# Patient Record
Sex: FEMALE | Race: WHITE | Employment: UNEMPLOYED | URBAN - METROPOLITAN AREA
[De-identification: names, ages, dates, MRNs, and addresses within clinical notes are randomized per-mention and may not be internally consistent; named-entity substitution may affect disease eponyms.]

---

## 2020-05-06 ENCOUNTER — OFFICE VISIT (OUTPATIENT)
Dept: OBGYN CLINIC | Age: 40
End: 2020-05-06
Payer: COMMERCIAL

## 2020-05-06 VITALS
HEART RATE: 87 BPM | SYSTOLIC BLOOD PRESSURE: 124 MMHG | BODY MASS INDEX: 33.44 KG/M2 | DIASTOLIC BLOOD PRESSURE: 81 MMHG | WEIGHT: 181.7 LBS | HEIGHT: 62 IN

## 2020-05-06 PROBLEM — Z64.1 GRAND MULTIPARITY: Status: ACTIVE | Noted: 2020-05-06

## 2020-05-06 PROBLEM — O09.90 HIGH-RISK PREGNANCY: Status: ACTIVE | Noted: 2020-05-06

## 2020-05-06 PROBLEM — J02.0 STREP THROAT: Status: ACTIVE | Noted: 2019-12-20

## 2020-05-06 PROBLEM — O09.529 ADVANCED MATERNAL AGE IN MULTIGRAVIDA: Status: ACTIVE | Noted: 2020-05-06

## 2020-05-06 PROBLEM — O42.90 AMNIOTIC FLUID LEAKING: Status: ACTIVE | Noted: 2019-07-20

## 2020-05-06 PROBLEM — J02.9 SORE THROAT: Status: RESOLVED | Noted: 2019-12-20 | Resolved: 2020-05-06

## 2020-05-06 PROBLEM — Z98.891 HISTORY OF CESAREAN SECTION: Status: ACTIVE | Noted: 2017-11-08

## 2020-05-06 PROBLEM — J02.0 STREP THROAT: Status: RESOLVED | Noted: 2019-12-20 | Resolved: 2020-05-06

## 2020-05-06 PROBLEM — Z86.718 HISTORY OF DVT (DEEP VEIN THROMBOSIS): Status: ACTIVE | Noted: 2020-05-06

## 2020-05-06 PROBLEM — I82.90 VTE (VENOUS THROMBOEMBOLISM): Status: ACTIVE | Noted: 2017-07-14

## 2020-05-06 PROBLEM — J02.9 SORE THROAT: Status: ACTIVE | Noted: 2019-12-20

## 2020-05-06 PROBLEM — O42.90 AMNIOTIC FLUID LEAKING: Status: RESOLVED | Noted: 2019-07-20 | Resolved: 2020-05-06

## 2020-05-06 LAB
CONTROL: PRESENT
PREGNANCY TEST URINE, POC: POSITIVE

## 2020-05-06 PROCEDURE — 81025 URINE PREGNANCY TEST: CPT | Performed by: ADVANCED PRACTICE MIDWIFE

## 2020-05-06 PROCEDURE — 99202 OFFICE O/P NEW SF 15 MIN: CPT | Performed by: ADVANCED PRACTICE MIDWIFE

## 2020-05-06 RX ORDER — CEPHALEXIN 500 MG/1
CAPSULE ORAL
COMMUNITY
Start: 2020-02-02 | End: 2020-05-19 | Stop reason: ALTCHOICE

## 2020-05-06 NOTE — PROGRESS NOTES
[facility]   [place] [address] DR. Leann White [address] [place] [id number]  Dept: [phone]    Patient Name: Karan Ramos  Patient Age: 44 y.o. Date of Visit: 2020    SUBJECTIVE:    Chief Complaint:  Chief Complaint   Patient presents with    Amenorrhea     LMP 20       HPI:    Lori Kang  is being seen today for missed menses. She reports a  positive pregnancy test on end of march  This  is a planned pregnancy. She is  accepting at this time. LMP: Patient's last menstrual period was 2020. Sure and definite: Yes    28 day cycle: Yes    She was not on a contraceptive at the time of conception. Estimated weeks gestation today : 10w0d  Tentative TRINITY: 20    Relationship with FOB:  Iron Barton)    Patient reports concerns: no    Previous deliveries at [facility], reports has had one midwife birth and desires that experience again. Reports G3 birth was a primary c/s due to placenta abruption and has had 4 vaginal births since then. Reports had a blood clot in  when on birth control and goes on lovenox during her pregnancies. Reports no history of hypertension or gestational diabetes. OB History        7    Para   7    Term   7            AB        Living   7       SAB        TAB        Ectopic        Molar        Multiple        Live Births   7              No past medical history on file. Current Outpatient Medications   Medication Sig Dispense Refill    cephALEXin (KEFLEX) 500 MG capsule        No current facility-administered medications for this visit.           OBJECTIVE:    /81 (Site: Left Upper Arm, Position: Sitting, Cuff Size: Medium Adult)   Pulse 87   Ht 5' 2.4\" (1.585 m)   Wt 181 lb 11.2 oz (82.4 kg)   LMP 2020   BMI 32.81 kg/m²     FHT: 159    Mother's ethnicity:     Father's ethnicity:       She is complaining today of:   Pain: No  Cramping: No  Bleeding or spotting:

## 2020-05-19 ENCOUNTER — ROUTINE PRENATAL (OUTPATIENT)
Dept: PERINATAL CARE | Age: 40
End: 2020-05-19
Payer: COMMERCIAL

## 2020-05-19 ENCOUNTER — TELEPHONE (OUTPATIENT)
Dept: PERINATAL CARE | Age: 40
End: 2020-05-19

## 2020-05-19 VITALS
SYSTOLIC BLOOD PRESSURE: 116 MMHG | HEART RATE: 80 BPM | RESPIRATION RATE: 16 BRPM | DIASTOLIC BLOOD PRESSURE: 64 MMHG | TEMPERATURE: 98.6 F | WEIGHT: 182 LBS | HEIGHT: 62 IN | BODY MASS INDEX: 33.49 KG/M2

## 2020-05-19 PROBLEM — Z87.59 HISTORY OF PLACENTAL ABRUPTION: Status: ACTIVE | Noted: 2020-05-19

## 2020-05-19 PROCEDURE — 99242 OFF/OP CONSLTJ NEW/EST SF 20: CPT | Performed by: OBSTETRICS & GYNECOLOGY

## 2020-05-19 PROCEDURE — 76813 OB US NUCHAL MEAS 1 GEST: CPT | Performed by: OBSTETRICS & GYNECOLOGY

## 2020-05-19 PROCEDURE — 76801 OB US < 14 WKS SINGLE FETUS: CPT | Performed by: OBSTETRICS & GYNECOLOGY

## 2020-05-27 ENCOUNTER — HOSPITAL ENCOUNTER (OUTPATIENT)
Age: 40
Setting detail: SPECIMEN
Discharge: HOME | End: 2020-05-27
Payer: COMMERCIAL

## 2020-05-27 ENCOUNTER — NURSE ONLY (OUTPATIENT)
Dept: OBGYN CLINIC | Age: 40
End: 2020-05-27
Payer: COMMERCIAL

## 2020-05-27 LAB
HCT VFR BLD CALC: 37.9 % (ref 36.3–47.1)
HEMOGLOBIN: 12.5 G/DL (ref 11.9–15.1)
MCH RBC QN AUTO: 28.2 PG (ref 25.2–33.5)
MCHC RBC AUTO-ENTMCNC: 33 G/DL (ref 28.4–34.8)
MCV RBC AUTO: 85.4 FL (ref 82.6–102.9)
NRBC AUTOMATED: 0 PER 100 WBC
PDW BLD-RTO: 13.8 % (ref 11.8–14.4)
PLATELET # BLD: 262 K/UL (ref 138–453)
PMV BLD AUTO: 10.3 FL (ref 8.1–13.5)
RBC # BLD: 4.44 M/UL (ref 3.95–5.11)
WBC # BLD: 7.3 K/UL (ref 3.5–11.3)

## 2020-05-27 PROCEDURE — 36415 COLL VENOUS BLD VENIPUNCTURE: CPT | Performed by: ADVANCED PRACTICE MIDWIFE

## 2020-05-27 NOTE — PROGRESS NOTES
Performed venipuncture of the RT hand. Pt tolerated well. Blood Flow obtained.   Collected _ -SST  x -Lav    Done in office

## 2020-06-03 ENCOUNTER — HOSPITAL ENCOUNTER (OUTPATIENT)
Age: 40
Setting detail: SPECIMEN
Discharge: HOME | End: 2020-06-03
Payer: COMMERCIAL

## 2020-06-03 ENCOUNTER — NURSE ONLY (OUTPATIENT)
Dept: OBGYN CLINIC | Age: 40
End: 2020-06-03
Payer: COMMERCIAL

## 2020-06-03 LAB
ABSOLUTE EOS #: 0.13 K/UL (ref 0–0.44)
ABSOLUTE IMMATURE GRANULOCYTE: <0.03 K/UL (ref 0–0.3)
ABSOLUTE LYMPH #: 1.3 K/UL (ref 1.1–3.7)
ABSOLUTE MONO #: 0.35 K/UL (ref 0.1–1.2)
BASOPHILS # BLD: 0 % (ref 0–2)
BASOPHILS ABSOLUTE: <0.03 K/UL (ref 0–0.2)
DIFFERENTIAL TYPE: ABNORMAL
EOSINOPHILS RELATIVE PERCENT: 2 % (ref 1–4)
HCT VFR BLD CALC: 38.8 % (ref 36.3–47.1)
HEMOGLOBIN: 12.2 G/DL (ref 11.9–15.1)
IMMATURE GRANULOCYTES: 0 %
LYMPHOCYTES # BLD: 18 % (ref 24–43)
MCH RBC QN AUTO: 27.6 PG (ref 25.2–33.5)
MCHC RBC AUTO-ENTMCNC: 31.4 G/DL (ref 28.4–34.8)
MCV RBC AUTO: 87.8 FL (ref 82.6–102.9)
MONOCYTES # BLD: 5 % (ref 3–12)
NRBC AUTOMATED: 0 PER 100 WBC
PDW BLD-RTO: 14.1 % (ref 11.8–14.4)
PLATELET # BLD: 268 K/UL (ref 138–453)
PLATELET ESTIMATE: ABNORMAL
PMV BLD AUTO: 10.9 FL (ref 8.1–13.5)
RBC # BLD: 4.42 M/UL (ref 3.95–5.11)
RBC # BLD: ABNORMAL 10*6/UL
SEG NEUTROPHILS: 75 % (ref 36–65)
SEGMENTED NEUTROPHILS ABSOLUTE COUNT: 5.41 K/UL (ref 1.5–8.1)
WBC # BLD: 7.2 K/UL (ref 3.5–11.3)
WBC # BLD: ABNORMAL 10*3/UL

## 2020-06-03 PROCEDURE — 36415 COLL VENOUS BLD VENIPUNCTURE: CPT | Performed by: ADVANCED PRACTICE MIDWIFE

## 2020-06-17 ENCOUNTER — INITIAL PRENATAL (OUTPATIENT)
Dept: OBGYN CLINIC | Age: 40
End: 2020-06-17

## 2020-06-17 ENCOUNTER — HOSPITAL ENCOUNTER (OUTPATIENT)
Age: 40
Setting detail: SPECIMEN
Discharge: HOME | End: 2020-06-17
Payer: COMMERCIAL

## 2020-06-17 VITALS
BODY MASS INDEX: 32.99 KG/M2 | WEIGHT: 182.7 LBS | HEART RATE: 83 BPM | SYSTOLIC BLOOD PRESSURE: 116 MMHG | DIASTOLIC BLOOD PRESSURE: 68 MMHG

## 2020-06-17 PROCEDURE — 0500F INITIAL PRENATAL CARE VISIT: CPT | Performed by: ADVANCED PRACTICE MIDWIFE

## 2020-06-17 RX ORDER — ASPIRIN 81 MG/1
81 TABLET ORAL DAILY
Status: ON HOLD | COMMUNITY
End: 2020-11-30

## 2020-06-17 RX ORDER — CHOLECALCIFEROL (VITAMIN D3) 125 MCG
500 CAPSULE ORAL DAILY
COMMUNITY
End: 2021-04-06 | Stop reason: ALTCHOICE

## 2020-06-17 NOTE — PROGRESS NOTES
o She declined CF/SMA/FX testing.  She was counseled on Toxoplasmosis/Listeriosis (cats/raw meat).  She denies tobacco use. The patient was counseled on the risks of tobacco abuse, both maternal and fetal. She was instructed to stop smoking if currently using tobacco. Morbidity, mortality, and cessation programs were reviewed. The risks include but are not limited to increased risks of  labor,  delivery, premature rupture of membranes, intrauterine growth restriction, intrauterine fetal demise and abruptio placenta. Secondary smoke risks were also reviewed. Increases in cancer, respiratory problems, and sudden infant death syndrome were reviewed as well.  The patient was informed of a 2-4% risk of congenital anomalies in the general population. She was questioned in detail regarding any genetic misnomer history, chromosomal abnormalities, or learning disabilities in herself, the father of the baby or their families. She denies any history as stated above.  She declined further genetic testing. Nuchal Translucency/First Trimester Screen and/or Single Marker MSAFP testing was not ordered with attention to timing. She was informed that karyotyping is the only way to evaluate the fetus for genetic problems and genetic lethal anomalies. Amniocentesis was discussed: Not indicated.  Route of delivery and counseling on vaginal, operative vaginal, and  sections were discussed. Further counseling will be handled by the provider at subsequent visits.  Encouraged well-balanced diet, plenty of rest when needed, prenatal vitamins daily and walking for exercise. Discussed self-help for nausea, avoiding OTC medications until consulting provider or pharmacist, other than Tylenol PRN, minimal caffeine (1-2 cups daily) and avoiding alcohol. Discussed exercise safety in pregnancy.    The patient was informed that [facility] only delivers at  [address] and is agreeable to delivery at [facility].  She was made aware of the Midwife Philosophy against Elective Induction.  M referral placed. Javier Bertrand was seen today for initial prenatal visit. Diagnoses and all orders for this visit:    High-risk pregnancy in second trimester  -     HIV Screen; Future  -     PRENATAL PROFILE I; Future  -     Culture, Urine; Future  -     Urine Drug Screen; Future  -     C.trachomatis N.gonorrhoeae DNA, Urine; Future  -     Glucose Tolerance, 1 Hr; Future        Upon completion of the visit all questions were answered. ROS was done and is negative except as documented in HPI. History was reviewed as documented on Epic Navigator. Give bag  exercise sheet  pelvimetry  [employer]? Patient was seen with total face to face time of 25 minutes. More than 50% of this visit was on counseling and education regarding her diagnoses and her options. She was also counseled on her preventative health maintenance recommendations and follow-up. No follow-ups on file.     Electronically signed by: MAIRA Keith CNM

## 2020-06-17 NOTE — PROGRESS NOTES
New Obstetric Intake     Patient Name:Arlette Rose  Patient Age: 36 y.o. Date of Visit: 2020  Patient's estimated gestational age at visit: 16w0d    Any Concerns Today: no  Patient reports no complaints. She denies spotting, cramping or pain. OB History        8    Para   7    Term   7            AB        Living   7       SAB        TAB        Ectopic        Molar        Multiple        Live Births   7                Information about this pregnancy:    Planned Pregnancy: Yes, Accepting: Yes   Father of Baby: Janine Montez and is involved with the pregnancy   Patient's last menstrual period was 2020., Regular menses:  Yes   Date of Last Pap Smear: 20 normal   On Birth Control at Time of Conception: no   Early Dating Ultrasound: completed   Desires first trimester screening: Yes - already completed   Desires CF/SMA/FragileX screening: No    Risk Screening   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, Environmental/Work Hazards: No   Smoker: No   History of Substance Abuse: no   History of Sexual Abuse: no   Current of past history of intimate partner violence: no   Teratogen Exposure since finding out pregnant: no   Pets at home: yes - dog    Infection History:   Personal History of Chicken Pox or varicella vaccination: Yes   Agreeable to flu shot: possible   Agreeable to tdap: please discuss   Exposed to TB or live with someone with TB: no   Patient or partner history of genital herpes: no   Rash or viral illness since last menstrual period: no   Prior GBS-infected child: no   History of sexually transmitted infection(s) (STIs): no    Any recent travel within the last 3 months out of the country: no, Partner: no    Previous Birth History:    Vaginal Birth: yes    Birth: yes   Any previous birth complications: yes - placental abruption   History of hemorrhage during/after birth: no    High Risk Factor Screening for this Pregnancy:   Age greater than 35

## 2020-06-18 LAB
AMPHETAMINE SCREEN URINE: NEGATIVE
BARBITURATE SCREEN URINE: NEGATIVE
BENZODIAZEPINE SCREEN, URINE: NEGATIVE
BUPRENORPHINE URINE: NORMAL
C. TRACHOMATIS DNA ,URINE: NEGATIVE
CANNABINOID SCREEN URINE: NEGATIVE
COCAINE METABOLITE, URINE: NEGATIVE
CULTURE: ABNORMAL
CULTURE: ABNORMAL
Lab: ABNORMAL
MDMA URINE: NORMAL
METHADONE SCREEN, URINE: NEGATIVE
METHAMPHETAMINE, URINE: NORMAL
N. GONORRHOEAE DNA, URINE: NEGATIVE
OPIATES, URINE: NEGATIVE
OXYCODONE SCREEN URINE: NEGATIVE
PHENCYCLIDINE, URINE: NEGATIVE
PROPOXYPHENE, URINE: NORMAL
SPECIMEN DESCRIPTION: ABNORMAL
SPECIMEN DESCRIPTION: NORMAL
TEST INFORMATION: NORMAL
TRICYCLIC ANTIDEPRESSANTS, UR: NORMAL

## 2020-06-22 PROBLEM — R82.71 GBS BACTERIURIA: Status: ACTIVE | Noted: 2020-06-22

## 2020-06-22 RX ORDER — CEPHALEXIN 500 MG/1
500 CAPSULE ORAL 2 TIMES DAILY
Qty: 20 CAPSULE | Refills: 0 | Status: SHIPPED | OUTPATIENT
Start: 2020-06-22 | End: 2020-07-02

## 2020-07-01 ENCOUNTER — HOSPITAL ENCOUNTER (OUTPATIENT)
Age: 40
Setting detail: SPECIMEN
Discharge: HOME | End: 2020-07-01
Payer: COMMERCIAL

## 2020-07-01 LAB — HOMOCYSTEINE: 3.7 UMOL/L

## 2020-07-03 LAB
AT-III ACTIVITY: 94 % (ref 83–122)
PROTEIN C ACTIVITY: 119 %
PROTEIN S ACTIVITY: 66 % (ref 59–130)

## 2020-07-06 LAB
FACTOR V LEIDEN MUTATION: NORMAL
MTHFR MUTATION 677T/A1298C: NORMAL
PROTHROMBIN G20210A MUTATION: NORMAL

## 2020-07-07 LAB
ANTICARDIOLIPIN IGA ANTIBODY: 1.3 APU
ANTICARDIOLIPIN IGG ANTIBODY: 2.3 GPU
CARDIOLIPIN AB IGM: 18.6 MPU
DILUTE RUSSELL VIPER VENOM TIME: NORMAL
INR BLD: 0.9
LUPUS ANTICOAG: NORMAL
PARTIAL THROMBOPLASTIN TIME: 23.2 SEC (ref 20.5–30.5)
PROTHROMBIN TIME: 9.3 SEC (ref 9–12)

## 2020-07-11 LAB
MISCELLANEOUS LAB TEST RESULT: NORMAL
TEST NAME: NORMAL

## 2020-07-14 ENCOUNTER — TELEPHONE (OUTPATIENT)
Dept: PERINATAL CARE | Age: 40
End: 2020-07-14

## 2020-07-14 NOTE — TELEPHONE ENCOUNTER
Pt given results of MTHFR and physician recommendation to start foltx daily. Pt stated has already started taking folic acid and Q59. Pt instructed to add B6. Pt voiced understanding.

## 2020-07-21 ENCOUNTER — ROUTINE PRENATAL (OUTPATIENT)
Dept: PERINATAL CARE | Age: 40
End: 2020-07-21
Payer: COMMERCIAL

## 2020-07-21 VITALS
WEIGHT: 185 LBS | BODY MASS INDEX: 34.04 KG/M2 | HEIGHT: 62 IN | RESPIRATION RATE: 16 BRPM | TEMPERATURE: 98.3 F | DIASTOLIC BLOOD PRESSURE: 69 MMHG | HEART RATE: 88 BPM | SYSTOLIC BLOOD PRESSURE: 111 MMHG

## 2020-07-21 PROBLEM — O44.40 LOW-LYING PLACENTA: Status: ACTIVE | Noted: 2020-07-21

## 2020-07-21 PROCEDURE — 76817 TRANSVAGINAL US OBSTETRIC: CPT | Performed by: OBSTETRICS & GYNECOLOGY

## 2020-07-21 PROCEDURE — 99211 OFF/OP EST MAY X REQ PHY/QHP: CPT | Performed by: OBSTETRICS & GYNECOLOGY

## 2020-07-21 PROCEDURE — 76811 OB US DETAILED SNGL FETUS: CPT | Performed by: OBSTETRICS & GYNECOLOGY

## 2020-07-21 RX ORDER — MULTIVITAMIN WITH IRON
100 TABLET ORAL DAILY
COMMUNITY
End: 2021-04-06 | Stop reason: ALTCHOICE

## 2020-07-21 NOTE — PROGRESS NOTES
MFM Office Visit:  Patient declined both invasive prenatal diagnostic testing and non-invasive prenatal testing (NIPT/NIPS) again today. MSAFP (maternal serum alpha-feto protein level) lab draw declined by patient. I would advise continuation of daily oral baby aspirin 81 mg based on guidelines by the USPSTF/ACOG (for preeclampsia prevention for pregnant women at \"high-risk\"  for preeclampsia). Continue daily oral foltx as prescribed.

## 2020-07-23 ENCOUNTER — TELEMEDICINE (OUTPATIENT)
Dept: OBGYN CLINIC | Age: 40
End: 2020-07-23

## 2020-07-23 PROCEDURE — 0502F SUBSEQUENT PRENATAL CARE: CPT | Performed by: ADVANCED PRACTICE MIDWIFE

## 2020-07-23 ASSESSMENT — PATIENT HEALTH QUESTIONNAIRE - PHQ9
1. LITTLE INTEREST OR PLEASURE IN DOING THINGS: 0
SUM OF ALL RESPONSES TO PHQ QUESTIONS 1-9: 0
SUM OF ALL RESPONSES TO PHQ9 QUESTIONS 1 & 2: 0
SUM OF ALL RESPONSES TO PHQ QUESTIONS 1-9: 0
2. FEELING DOWN, DEPRESSED OR HOPELESS: 0

## 2020-07-23 NOTE — PROGRESS NOTES
2020    TELEHEALTH EVALUATION -- Audio/Visual (During QPYVK-45 public health emergency)    SUBJECTIVE:  Paola Colbert (: 1980) has requested an audio/video evaluation for the following concern(s): Routine OB care  She reports feeling fetal movement. She denies vaginal bleeding. She denies vaginal discharge. She denies leaking of fluid. She denies uterine cramping. She denies nausea and/or vomiting. Martin Toledo reports she saw Dr. Sri De Oliveira on Monday and they saw a low lying placenta, is not concerned about this finding or that baby was breech. Reports her last baby was breech until 37 weeks. Turned with inversions at home. Is using Lovenox 40 mg daily per [facility] recommendation, has next visit scheduled. Reports she is feeling very well and does not have concerns. Reports she has not been able to complete her early 1 hour glucola or her Prenatal Profile or HIV labs. See Plan (too late for early glucola, will get other labs drawn before next visit).      OBJECTIVE:  PHYSICAL EXAMINATION:  Vital Signs: (As obtained by patient/caregiver or practitioner observation)  Blood pressure- MANDA Heart rate- MANDA   Respiratory rate-    Temperature- MANDA Pulse oximetry-  MANDA  FHT- MANDA     Constitutional: [x] Appears well-developed and well-nourished [x] No apparent distress      [] Abnormal-   Mental status  [x] Alert and awake  [x] Oriented to person/place/time [x]Able to follow commands      Pulmonary/Chest: [x] Respiratory effort normal.  [x] No visualized signs of difficulty breathing or respiratory distress        [] Abnormal-      Musculoskeletal:   [x] Normal gait with no signs of ataxia         [] Normal range of motion of neck        [] Abnormal-       Neurological:        [x] No Facial Asymmetry (Cranial nerve 7 motor function) (limited exam to video visit)          [] No gaze palsy        [] Abnormal-         Skin:        [x] No significant exanthematous lesions or discoloration noted on facial skin         [] Abnormal-            Psychiatric:       [x] Normal Affect [x] No Hallucinations        [] Abnormal-     Other pertinent observable physical exam findings-     ASSESSMENT/PLAN:  IUP @ 21w1d    Petey Paredes will begin to monitor fetal movement daily   Quad screen and or single marker AFP results were discussed, 1st tri screen low risk, declined AFP   Educated about glucola screening between 24-28 weeks. Reviewed too late for early glucola, she can perform her 24-28 week glucola screening at her [address]. Reviewed Prenatal Profile and HIV need to be done.  Discussed Tdap vaccine between 27-37 weeks.  Labs were not ordered.  BMI and appropriate weight gain recommendations were not discussed. Normal: BMI < 25: Gain 25-35 lb  Overweight: BMI 25-30: Gain 15-25 lb  Obese: BMI > 30: Gain 11-20 lb  · Reviewed precautions recommended by the CDC for pregnant women to prevent gideon and spread of COVID-19. Encouraged to contact CNMs via telephone if symptoms develop for screening. Reviewed current hospital policies and reiterated they are subject to change. High-risk pregnancy in second trimester  · Anatomy scan reviewed, 7/21: breech, low lying placenta (see below), normal fetal anatomy. CL normal without funneling. Rpt ultrasound at 26 weeks. Low-lying placenta  · 7/21/20: placenta 15.1 mm from cervical os. Reviewed this finding, repeat at 26 weeks    History of DVT (deep vein thrombosis) - 2002 on OCP  · Using Lovenox 40 mg daily per MFM order  · 7 and 14 day post lovenox initiation CBC - done  · Reviewed thrombophilia labs- Prothrombin mutation [id number] negative, Lupus anticoagulant negative, Protein C&S negative, FVL negative, AT-III negative. · Heterozygous MTHFR 677T mutation positive. · Per MFM: I would advise appropriate conversion to unfractionated prophylactic subcutaneous Heparin 10,000 units on a twice daily basis. ..  Postpartum management should consist of either prophylactic intermediate dose or an adjusted dose anticoagulation regimen for at least six weeks postpartum. \"  · Reviewed warning signs that Merrily Needs should report to CNMs    Antepartum multigravida of advanced maternal age  · 1st tri screen low risk, declined NIPT, declined AFP    GBS bacteriuria  · Treated 20  · Collect GBS swab at 36 weeks d/t PCN allergy for sensitivity    Desires  (vaginal birth after ) trial  · Hx 4 successful VBACS  · Does not need consultation with W/S  · Need to obtain Op Note- will work on obtaining from [facility] ()  · At [facility] provide consent/counseling    Uterine fibroid during pregnancy, antepartum  · Reviewed fundal/right lateral fibroid 2.74 x 3.11 x 1.89 cm is unlikely to cause issue with labor or delivery, will re-evaluate at 26 weeks    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 ([facility] [address]  · Homocysteine negative  · Taking folate daily  · MFM consult complete    Obesity in pregnancy, antepartum  · Did not complete early glucola in time, reviewed glucola at [facility] to screen for GDM  · Monitor weight gain  · Taking baby ASA daily per MFM's recommendation       The problem list was reviewed and updated with any new issues from today's visit. Return in about 4 weeks (around 2020) for OB visit with Midwives. Ana Scott is a 36 y.o. female being evaluated by a Virtual Visit (video visit) encounter to address concerns as mentioned above. A caregiver was present when appropriate. Due to this being a TeleHealth encounter (During MIALM-72 public health emergency), evaluation of the following organ systems was limited: Vitals/Constitutional/EENT/Resp/CV/GI//MS/Neuro/Skin/Heme-Lymph-Imm.   Pursuant to the emergency declaration under the [facility], [address] [place] [address] authority and the [employer] Act, this Virtual Visit was conducted with patient's (and/or legal guardian's) consent, to reduce the patient's risk of exposure to COVID-19 and provide necessary medical care. The patient (and/or legal guardian) has also been advised to contact this office for worsening conditions or problems, and seek emergency medical treatment and/or call 911 if deemed necessary. Patient identification was verified at the start of the visit: Yes    Total time spent on this encounter: 40 min    Services were provided through a video synchronous discussion virtually to substitute for in-person clinic visit. Patient in her home, provider at [facility]. --MAIRA Coffey CNM on 7/24/2020 at 12:45 PM    An electronic signature was used to authenticate this note.

## 2020-07-24 PROBLEM — Z86.59 HISTORY OF POSTPARTUM DEPRESSION: Status: ACTIVE | Noted: 2020-07-24

## 2020-07-24 PROBLEM — Z86.718 HISTORY OF DVT (DEEP VEIN THROMBOSIS): Chronic | Status: ACTIVE | Noted: 2020-05-06

## 2020-07-24 PROBLEM — O34.10 UTERINE FIBROID DURING PREGNANCY, ANTEPARTUM: Status: ACTIVE | Noted: 2020-07-24

## 2020-07-24 PROBLEM — Z87.59 HISTORY OF VACUUM EXTRACTION ASSISTED DELIVERY: Status: ACTIVE | Noted: 2020-07-24

## 2020-07-24 PROBLEM — O99.210 OBESITY IN PREGNANCY, ANTEPARTUM: Status: ACTIVE | Noted: 2020-07-24

## 2020-07-24 PROBLEM — R82.71 GBS BACTERIURIA: Chronic | Status: ACTIVE | Noted: 2020-06-22

## 2020-07-24 PROBLEM — Z87.59 HISTORY OF POSTPARTUM DEPRESSION: Status: ACTIVE | Noted: 2020-07-24

## 2020-07-24 PROBLEM — O34.219 DESIRES VBAC (VAGINAL BIRTH AFTER CESAREAN) TRIAL: Status: ACTIVE | Noted: 2020-07-24

## 2020-07-24 PROBLEM — O44.40 LOW-LYING PLACENTA: Chronic | Status: ACTIVE | Noted: 2020-07-21

## 2020-07-24 PROBLEM — D25.9 UTERINE FIBROID DURING PREGNANCY, ANTEPARTUM: Status: ACTIVE | Noted: 2020-07-24

## 2020-07-24 PROBLEM — I82.90 VTE (VENOUS THROMBOEMBOLISM): Status: RESOLVED | Noted: 2017-07-14 | Resolved: 2020-07-24

## 2020-07-27 PROBLEM — O34.219 DESIRES VBAC (VAGINAL BIRTH AFTER CESAREAN) TRIAL: Chronic | Status: ACTIVE | Noted: 2020-07-24

## 2020-08-13 ENCOUNTER — HOSPITAL ENCOUNTER (OUTPATIENT)
Age: 40
Setting detail: SPECIMEN
Discharge: HOME | End: 2020-08-13
Payer: COMMERCIAL

## 2020-08-13 ENCOUNTER — ROUTINE PRENATAL (OUTPATIENT)
Dept: OBGYN CLINIC | Age: 40
End: 2020-08-13

## 2020-08-13 VITALS
HEART RATE: 81 BPM | SYSTOLIC BLOOD PRESSURE: 106 MMHG | TEMPERATURE: 97.3 F | WEIGHT: 187.2 LBS | BODY MASS INDEX: 33.8 KG/M2 | DIASTOLIC BLOOD PRESSURE: 70 MMHG

## 2020-08-13 PROCEDURE — 0502F SUBSEQUENT PRENATAL CARE: CPT | Performed by: ADVANCED PRACTICE MIDWIFE

## 2020-08-13 NOTE — PROGRESS NOTES
SUBJECTIVE:  Brooke Cannon is here for her return OB visit. She reports fetal movement. She denies vaginal bleeding. She denies vaginal discharge. She denies leaking of fluid. She denies uterine contraction activity. She denies nausea and/or vomiting. She denies retaining fluid in her extremities. Not able to do her 1hour glucose today. Feeling well has no concerns. OBJECTIVE:  Blood pressure 106/70, pulse 81, temperature 97.3 °F (36.3 °C), weight 187 lb 3.2 oz (84.9 kg), last menstrual period 2020, not currently breastfeeding. Brooke Cannon has not the flu vaccine as appropriate  Brooke Cannon has not the Tdap vaccine as appropriate- discussed and to be offered at [facility]        ASSESSMENT/PLAN:  1. Supervision of high risk pregnancy in second trimester  IUP at 24w1d  S=D    2. 24 weeks gestation of pregnancy    3. GBS bacteriuria  -Was treated  - Culture, Urine; Future    4. Antepartum multigravida of advanced maternal age  -First trimester screen WNL  -Declined NIPT  -Outside of [place] for AFP  -Agreeable to IOL 39-40 weeks    5. Desires  (vaginal birth after ) trial  -Consent signed and uploaded to media    6. History of DVT (deep vein thrombosis)  -Con Lovenox 40mg daily    7. Low-lying placenta  -Discussed pelvic rest has F/U MFM appt scheduled    8. [address] multiparity        The problem list was reviewed and updated with any new issues from today's visit    Brooke Cannon will monitor fetal movement daily. 28 week lab panel was discussed and was ordered. Has order for 1 hour in system. Did not have prenatal labs drawn can be drawn at [facility] with glucola. RhoGam was discussed and was not ordered. Brooke Cannon was counseled regarding all of the above    Patient was seen with total face to face time of 15 minutes. More than 50% of this visit was for education and counseling.

## 2020-08-14 LAB
CULTURE: ABNORMAL
Lab: ABNORMAL
SPECIMEN DESCRIPTION: ABNORMAL

## 2020-09-01 ENCOUNTER — ROUTINE PRENATAL (OUTPATIENT)
Dept: PERINATAL CARE | Age: 40
End: 2020-09-01
Payer: COMMERCIAL

## 2020-09-01 VITALS
DIASTOLIC BLOOD PRESSURE: 60 MMHG | HEART RATE: 80 BPM | SYSTOLIC BLOOD PRESSURE: 119 MMHG | RESPIRATION RATE: 16 BRPM | BODY MASS INDEX: 34.6 KG/M2 | HEIGHT: 62 IN | WEIGHT: 188 LBS | TEMPERATURE: 97.4 F

## 2020-09-01 PROCEDURE — 76816 OB US FOLLOW-UP PER FETUS: CPT | Performed by: OBSTETRICS & GYNECOLOGY

## 2020-09-01 PROCEDURE — 76817 TRANSVAGINAL US OBSTETRIC: CPT | Performed by: OBSTETRICS & GYNECOLOGY

## 2020-09-01 PROCEDURE — 76820 UMBILICAL ARTERY ECHO: CPT | Performed by: OBSTETRICS & GYNECOLOGY

## 2020-09-14 ENCOUNTER — HOSPITAL ENCOUNTER (OUTPATIENT)
Age: 40
Setting detail: SPECIMEN
Discharge: HOME | End: 2020-09-14
Payer: COMMERCIAL

## 2020-09-14 ENCOUNTER — ROUTINE PRENATAL (OUTPATIENT)
Dept: OBGYN CLINIC | Age: 40
End: 2020-09-14

## 2020-09-14 VITALS
TEMPERATURE: 97.2 F | DIASTOLIC BLOOD PRESSURE: 68 MMHG | WEIGHT: 185.7 LBS | HEART RATE: 84 BPM | SYSTOLIC BLOOD PRESSURE: 107 MMHG | BODY MASS INDEX: 33.53 KG/M2

## 2020-09-14 LAB
GLUCOSE ADMINISTRATION: ABNORMAL
GLUCOSE TOLERANCE SCREEN 50G: 208 MG/DL (ref 70–135)

## 2020-09-14 PROCEDURE — 0502F SUBSEQUENT PRENATAL CARE: CPT | Performed by: ADVANCED PRACTICE MIDWIFE

## 2020-09-14 NOTE — PROGRESS NOTES
SUBJECTIVE:  Frieda Anderson is here for her return OB visit. She reports fetal movement. She denies  vaginal bleeding. She denies  vaginal discharge. She denies leaking of fluid. She denies uterine contraction activity. She denies nausea and/or vomiting. She denies retaining fluid in her extremities. Doing GCT today. OBJECTIVE:  Blood pressure 107/68, pulse 84, temperature 97.2 °F (36.2 °C), weight 185 lb 11.2 oz (84.2 kg), last menstrual period 2020, not currently breastfeeding. ASSESSMENT:  IUP @ 28 weeks  S = D    PLAN:  Frieda Anderson will monitor fetal movement daily. Fetal Kick Count was discussed and explained  The problem list was reviewed and updated as needed. 28 week lab results were reviewed. She did not receive RhoGam as needed. A+  [place]tran contractions vs  labor contractions were reviewed. Frieda Anderson was counseled regarding GCT. More than 50% of this 20 min visit was for education and counseling.

## 2020-09-14 NOTE — PROGRESS NOTES
Unable to leave urine at beginning of visit. Glucose Lot [id number]    Exp 02/28/2020  8:31am  Performed venipuncture of the left arm. Pt tolerated well. Blood Flow obtained.   Collected x -SST x -Lav

## 2020-09-15 ENCOUNTER — TELEPHONE (OUTPATIENT)
Dept: OBGYN CLINIC | Age: 40
End: 2020-09-15

## 2020-09-15 LAB
ABO/RH: NORMAL
ABSOLUTE EOS #: 0.08 K/UL (ref 0–0.44)
ABSOLUTE IMMATURE GRANULOCYTE: 0.04 K/UL (ref 0–0.3)
ABSOLUTE LYMPH #: 0.87 K/UL (ref 1.1–3.7)
ABSOLUTE MONO #: 0.21 K/UL (ref 0.1–1.2)
ANTIBODY SCREEN: NEGATIVE
BASOPHILS # BLD: 0 % (ref 0–2)
BASOPHILS ABSOLUTE: <0.03 K/UL (ref 0–0.2)
DIFFERENTIAL TYPE: ABNORMAL
EOSINOPHILS RELATIVE PERCENT: 1 % (ref 1–4)
HCT VFR BLD CALC: 34.6 % (ref 36.3–47.1)
HEMOGLOBIN: 10.8 G/DL (ref 11.9–15.1)
HEPATITIS B SURFACE ANTIGEN: NONREACTIVE
HIV AG/AB: NONREACTIVE
IMMATURE GRANULOCYTES: 1 %
LYMPHOCYTES # BLD: 11 % (ref 24–43)
MCH RBC QN AUTO: 28.1 PG (ref 25.2–33.5)
MCHC RBC AUTO-ENTMCNC: 31.2 G/DL (ref 28.4–34.8)
MCV RBC AUTO: 90.1 FL (ref 82.6–102.9)
MONOCYTES # BLD: 3 % (ref 3–12)
NRBC AUTOMATED: 0 PER 100 WBC
PDW BLD-RTO: 15.3 % (ref 11.8–14.4)
PLATELET # BLD: 251 K/UL (ref 138–453)
PLATELET ESTIMATE: ABNORMAL
PMV BLD AUTO: 9.4 FL (ref 8.1–13.5)
RBC # BLD: 3.84 M/UL (ref 3.95–5.11)
RBC # BLD: ABNORMAL 10*6/UL
RUBV IGG SER QL: 487 IU/ML
SEG NEUTROPHILS: 84 % (ref 36–65)
SEGMENTED NEUTROPHILS ABSOLUTE COUNT: 6.48 K/UL (ref 1.5–8.1)
T. PALLIDUM, IGG: NONREACTIVE
WBC # BLD: 7.7 K/UL (ref 3.5–11.3)
WBC # BLD: ABNORMAL 10*3/UL

## 2020-09-15 NOTE — TELEPHONE ENCOUNTER
----- Message from MAIRA Cook CNM sent at 9/15/2020  9:52 AM EDT -----  GCT was 208. Needs referral to M and diabetic ed.

## 2020-09-15 NOTE — TELEPHONE ENCOUNTER
Pt notified of failed 1' GCT and need to go directly to [facility] for diabetic ed. Referral placed.

## 2020-09-16 ENCOUNTER — TELEPHONE (OUTPATIENT)
Dept: PERINATAL CARE | Age: 40
End: 2020-09-16

## 2020-09-29 ENCOUNTER — ROUTINE PRENATAL (OUTPATIENT)
Dept: PERINATAL CARE | Age: 40
End: 2020-09-29
Payer: COMMERCIAL

## 2020-09-29 ENCOUNTER — TELEPHONE (OUTPATIENT)
Dept: OBGYN CLINIC | Age: 40
End: 2020-09-29

## 2020-09-29 VITALS
WEIGHT: 189 LBS | TEMPERATURE: 97.5 F | DIASTOLIC BLOOD PRESSURE: 64 MMHG | HEART RATE: 84 BPM | SYSTOLIC BLOOD PRESSURE: 118 MMHG | BODY MASS INDEX: 34.78 KG/M2 | HEIGHT: 62 IN | RESPIRATION RATE: 16 BRPM

## 2020-09-29 LAB
ABDOMINAL CIRCUMFERENCE: NORMAL
BIPARIETAL DIAMETER: NORMAL
ESTIMATED FETAL WEIGHT: NORMAL
FEMORAL DIAMETER: NORMAL
HC/AC: NORMAL
HEAD CIRCUMFERENCE: NORMAL

## 2020-09-29 PROCEDURE — 76821 MIDDLE CEREBRAL ARTERY ECHO: CPT | Performed by: OBSTETRICS & GYNECOLOGY

## 2020-09-29 PROCEDURE — 76819 FETAL BIOPHYS PROFIL W/O NST: CPT | Performed by: OBSTETRICS & GYNECOLOGY

## 2020-09-29 PROCEDURE — 76805 OB US >/= 14 WKS SNGL FETUS: CPT | Performed by: OBSTETRICS & GYNECOLOGY

## 2020-09-29 PROCEDURE — 76820 UMBILICAL ARTERY ECHO: CPT | Performed by: OBSTETRICS & GYNECOLOGY

## 2020-09-29 NOTE — TELEPHONE ENCOUNTER
Spoke with Sol Galvan in regards to the ultrasound reports. Patient has questions regarding how diagnosis was made for microcephaly based on her research. Recommended f/up with  to discuss diagnosis. Questions answered.

## 2020-09-29 NOTE — PROGRESS NOTES
Patient declined both invasive prenatal diagnostic testing and non-invasive prenatal testing (NIPT/NIPS) again today. Patient declines physician discussion today with respect to the offering of maternal blood work/serologies advised for various  infections, including coxsackie virus A and B viruses, CMV, parvovirus B19, toxoplasmosis (IgM and IgG serologies), etc: suspected small fetal head biometry/microcephaly. I would advise continuation of daily oral baby aspirin 81 mg based on guidelines by the USPSTF/ACOG (for preeclampsia prevention for pregnant women at \"high-risk\"  for preeclampsia). Continue daily oral foltx as prescribed. Strongly advise patient to be compliant with blood sugar monitoring as previously advised to minimize the potential of adverse  outcomes (e.g. fetal demise/stillbirth, polyhydramnios/oligohydramnios, fetal growth abnormalities, pregnancy loss, hypertensive disorders of pregnancy, indicated  delivery, etc.), potential maternal morbidities, etc.     Patient declines Maternal-Fetal Medicine consultation at this time regarding the medical/obstetrical complications of pregnancy (under indications/impression sections of the ultrasound report, including for gestational diabetes). Maternal-Fetal Medicine (MFM) attending physician will defer all management for these medical/obstetrical complications of pregnancy (under indications and impression sections of the ultrasound report) to the primary attending obstetrical physician/provider, as a result. Therefore, only an ultrasound evaluation was completed today in the MFM office.

## 2020-10-13 ENCOUNTER — ROUTINE PRENATAL (OUTPATIENT)
Dept: OBGYN CLINIC | Age: 40
End: 2020-10-13

## 2020-10-13 VITALS
DIASTOLIC BLOOD PRESSURE: 62 MMHG | WEIGHT: 188.4 LBS | BODY MASS INDEX: 34.02 KG/M2 | SYSTOLIC BLOOD PRESSURE: 106 MMHG | HEART RATE: 88 BPM

## 2020-10-13 PROBLEM — O24.410 DIET CONTROLLED GESTATIONAL DIABETES MELLITUS (GDM) IN THIRD TRIMESTER: Status: ACTIVE | Noted: 2020-10-13

## 2020-10-13 PROCEDURE — 0502F SUBSEQUENT PRENATAL CARE: CPT | Performed by: ADVANCED PRACTICE MIDWIFE

## 2020-10-13 NOTE — PROGRESS NOTES
SUBJECTIVE:  Yady Muñoz is here for her return OB visit. She reports fetal movement. She denies  vaginal bleeding. She denies  vaginal discharge. She denies leaking of fluid. She denies uterine contraction activity. She denies nausea and/or vomiting. She denies retaining fluid in her extremities. Overall feeling well. Would like to review her MFM scan and her options. States she does not want to go back to [facility] unless she needs to. OBJECTIVE:  Blood pressure 106/62, pulse 88, weight 188 lb 6.4 oz (85.5 kg), last menstrual period 02/26/2020, not currently breastfeeding. Yady Muñoz has not received the flu vaccine as appropriate  Yady Muñoz has not received the Tdap vaccine as appropriate    Biophysical Profile:   Amniotic Fluid Index: 11.3  Tone: Present  Movement: Present  Breathing: Present    Biophysical Score: 8 / 8    Fetal Position: Breech            ASSESSMENT/PLAN:  1. High risk pregnancy, antepartum  IUP at 32w6d  S=D    2. 32 weeks gestation of pregnancy  e    3. Diet controlled gestational diabetes mellitus (GDM) in third trimester  - blood glucose monitor kit and supplies; Dispense sufficient amount for QID testing needs for 30 days plus 2 refills. Dispense all needed supplies to include: monitor, strips, lancing device, lancets, control solutions, alcohol swabs. Dispense: 1 kit; Refill: 0  -Discussed NSTs weekly will be recommended if GDMA2 pt is agreeable  -Ok to send BS to [facility] weekly    4. RUKHSANA (amniotic fluid index) borderline low  -Reports does not want to go back to [facility] as recommended for fluid eval  -Discussed she does not have oligo at this time scan in office RUKHSANA 11.3 WNL  -She is agreeable to have RUKHSANA in office if needed. Discussed at this time RUKHSANA findings and reassuring   -Recommend growth scan and RUKHSANA reevaluation with MFM at 36 weeks. She is agreeable and MFM consult placed  - George 1898; Future    5.  History of DVT (deep vein thrombosis)  -Heparin at 36 weeks per MFM recommendations    6. Microcephaly of fetus, single or unspecified fetus  -Declines serologies  - US FETAL BIOPHYS PROFILE W NON STRESS; Future    7. Desires  (vaginal birth after ) trial  -consent signed 2020    8. Low-lying placenta  -Resolved    9. GBS bacteriuria  -treatment in labor    10. Multigravida of advanced maternal age in third trimester  -Open to IOL      The problem list was reviewed and updated with any new issues from today's visit  After reviewing and updating the problem list, the chart was sent to Dr Aguilar for review    Maricruz Mai will monitor fetal movement daily. 28 week lab results were reviewed. Fetal Kick Count was discussed and explained. [place]-Beltran contractions vs  labor contractions were reviewed. Signs and symptoms of Pre-Eclampsia were were reviewed and discussed  Initial discussion regarding birth plans was begun    Maricruz Mai was counseled regarding all of the above    Patient was seen with total face to face time of 15 minutes. More than 50% of this visit was for education and counseling.

## 2020-10-19 PROBLEM — O28.8 AFI (AMNIOTIC FLUID INDEX) BORDERLINE LOW: Status: ACTIVE | Noted: 2020-10-19

## 2020-10-19 PROBLEM — O35.07X0 FETAL MICROCEPHALY: Status: ACTIVE | Noted: 2020-10-19

## 2020-10-27 ENCOUNTER — ROUTINE PRENATAL (OUTPATIENT)
Dept: OBGYN CLINIC | Age: 40
End: 2020-10-27

## 2020-10-27 VITALS
TEMPERATURE: 97.5 F | SYSTOLIC BLOOD PRESSURE: 120 MMHG | WEIGHT: 185 LBS | BODY MASS INDEX: 33.4 KG/M2 | DIASTOLIC BLOOD PRESSURE: 72 MMHG | HEART RATE: 96 BPM

## 2020-10-27 PROCEDURE — 0502F SUBSEQUENT PRENATAL CARE: CPT | Performed by: ADVANCED PRACTICE MIDWIFE

## 2020-10-27 RX ORDER — IBUPROFEN 200 MG
1 TABLET ORAL 2 TIMES DAILY
Qty: 100 EACH | Refills: 1 | Status: ON HOLD
Start: 2020-10-27 | End: 2020-12-02 | Stop reason: HOSPADM

## 2020-10-27 RX ORDER — HEPARIN SODIUM 10000 [USP'U]/ML
10000 INJECTION, SOLUTION INTRAVENOUS; SUBCUTANEOUS 2 TIMES DAILY
Qty: 3 VIAL | Refills: 5 | Status: SHIPPED | OUTPATIENT
Start: 2020-10-27 | End: 2020-11-10 | Stop reason: SDUPTHER

## 2020-10-27 NOTE — PROGRESS NOTES
SUBJECTIVE:  Evelyn Medina is here for her return OB visit. She reports fetal movement. She denies  vaginal bleeding. She denies  vaginal discharge. She denies leaking of fluid. She denies uterine contraction activity. She denies nausea and/or vomiting. She denies retaining fluid in her extremities. Is feeling well. No major concerns today. Reports some normal discomforts of pregnancy. Brought BS logs- uploaded to media    OBJECTIVE:  Blood pressure 120/72, pulse 96, temperature 97.5 °F (36.4 °C), temperature source Temporal, weight 185 lb (83.9 kg), last menstrual period 2020, not currently breastfeeding. Evelyn Medina has not received the flu vaccine as appropriate  Evelyn Medina has not received the Tdap vaccine as appropriate    BSUS: Breech presentation     ASSESSMENT/PLAN:  1. High-risk pregnancy in third trimester  IUP 34w6d  S=D  Reviewed birth plan    2. 34 weeks gestation of pregnancy      3. Desires  (vaginal birth after ) trial  -H/o successful     4. GBS bacteriuria  -treatment during labor    5. History of DVT (deep vein thrombosis)  -Begin at 36 weeks per [facility]  - Heparin Sodium, Porcine, (HEPARIN, PORCINE,) 87498 UNIT/ML injection; Inject 1 mL into the skin 2 times daily  Dispense: 3 vial; Refill: 5  - INSULIN SYRINGE 1CC/29G ([employer] INS SYRINGE 1CC/29G) 29G X 1/2\" 1 ML MISC; 1 each by Does not apply route 2 times daily  Dispense: 100 each; Refill: 1    6. Multigravida of advanced maternal age in third trimester  -Historically goes into labor 38-39 weeks      7. Breech presentation, single or unspecified fetus  -Discussed making appt with Dr. Deven Gonzales to discuss POC    8.  Diet controlled gestational diabetes mellitus (GDM) in third trimester  -BS logs faxed to [facility]  -If Insulin added will need to begin NSTs weekly  -has growth US scheduled at [facility] 120, declined weekly US at this time      The problem list was reviewed and updated with any new issues from today's visit      Evelyn Medina will monitor fetal movement daily. 28 week lab results were reviewed. Fetal Kick Count was discussed and explained. [place] contractions vs  labor contractions were reviewed. Signs and symptoms of Pre-Eclampsia were were reviewed and discussed  Initial discussion regarding birth plans was begun    Sharri Le was counseled regarding all of the above    Patient was seen with total face to face time of 15 minutes. More than 50% of this visit was for education and counseling.

## 2020-11-04 ENCOUNTER — TELEPHONE (OUTPATIENT)
Dept: OBGYN CLINIC | Age: 40
End: 2020-11-04

## 2020-11-04 ENCOUNTER — ROUTINE PRENATAL (OUTPATIENT)
Dept: PERINATAL CARE | Age: 40
End: 2020-11-04
Payer: COMMERCIAL

## 2020-11-04 ENCOUNTER — TELEPHONE (OUTPATIENT)
Dept: PERINATAL CARE | Age: 40
End: 2020-11-04

## 2020-11-04 VITALS
BODY MASS INDEX: 33.4 KG/M2 | TEMPERATURE: 98 F | WEIGHT: 185 LBS | HEART RATE: 90 BPM | DIASTOLIC BLOOD PRESSURE: 72 MMHG | SYSTOLIC BLOOD PRESSURE: 129 MMHG

## 2020-11-04 PROCEDURE — 99243 OFF/OP CNSLTJ NEW/EST LOW 30: CPT | Performed by: OBSTETRICS & GYNECOLOGY

## 2020-11-04 PROCEDURE — 76819 FETAL BIOPHYS PROFIL W/O NST: CPT | Performed by: OBSTETRICS & GYNECOLOGY

## 2020-11-04 PROCEDURE — 76816 OB US FOLLOW-UP PER FETUS: CPT | Performed by: OBSTETRICS & GYNECOLOGY

## 2020-11-04 PROCEDURE — 76820 UMBILICAL ARTERY ECHO: CPT | Performed by: OBSTETRICS & GYNECOLOGY

## 2020-11-04 NOTE — TELEPHONE ENCOUNTER
Pt called to apologize for not bringing sugars into office yet. States she can come in today if necessary and drop them off. Advised that it is likely [facility] will copy and put in chart so that would not be necessary. She is supposed to have an appt w/ the docs 11/6 because she was breech last visit but she is convinced baby flipped last night. She will be going to [facility] this afternoon. Can she cancel her appt w/ the docs and scheduled with midwives if baby is vertex?

## 2020-11-04 NOTE — TELEPHONE ENCOUNTER
Novolog 6 units before dinner one month supply no refills. NPH insulin 10 units at bedtime one month supply no refills. Insulin syringes or pen needles one month supply no refills.  Script called into [employer] [phone]. Lynn Thompson

## 2020-11-04 NOTE — TELEPHONE ENCOUNTER
Pt called to inform us that baby is now vertex per [facility] USN. Pt was also informed that fetal head circumference is not as alarming as last USN indicated. Feels reassured. Also wanted to let us know that her blood sugars were reviewed by Dr. Marie Garcia and she has been started on insulin at dinner and bedtime. Pt aware she needs weekly NSTs and will need to bring blood sugar logs with her to appts. Has been scheduled for NST next week.

## 2020-11-10 ENCOUNTER — ROUTINE PRENATAL (OUTPATIENT)
Dept: OBGYN CLINIC | Age: 40
End: 2020-11-10
Payer: COMMERCIAL

## 2020-11-10 VITALS
TEMPERATURE: 97.4 F | WEIGHT: 185.6 LBS | DIASTOLIC BLOOD PRESSURE: 71 MMHG | BODY MASS INDEX: 33.51 KG/M2 | SYSTOLIC BLOOD PRESSURE: 125 MMHG | HEART RATE: 89 BPM

## 2020-11-10 PROCEDURE — 59025 FETAL NON-STRESS TEST: CPT | Performed by: ADVANCED PRACTICE MIDWIFE

## 2020-11-10 PROCEDURE — 0502F SUBSEQUENT PRENATAL CARE: CPT | Performed by: ADVANCED PRACTICE MIDWIFE

## 2020-11-10 RX ORDER — HEPARIN SODIUM 10000 [USP'U]/ML
10000 INJECTION, SOLUTION INTRAVENOUS; SUBCUTANEOUS 2 TIMES DAILY
Qty: 14 VIAL | Refills: 2 | Status: ON HOLD | OUTPATIENT
Start: 2020-11-10 | End: 2020-11-30

## 2020-11-10 NOTE — LETTER
[facility]) [facility]   [address] [place] [id number]  Phone: [phone]  Fax: [phone]    MAIRA Hawthorne CNM        November 10, 2020     Patient: Yung Moffett   YOB: 1980   Date of Visit: 11/10/2020       To Whom it May Concern:    Yung Moffett was seen in my clinic on 11/10/2020. She is pregnant with an estimated due date of 12/02/2020. If you have any questions or concerns, please don't hesitate to call.     Sincerely,         MAIRA Hawthorne CNM

## 2020-11-10 NOTE — PROGRESS NOTES
SUBJECTIVE:    Duane Huang is here for her routine OB visit. She reports  fetal movement. She denies  vaginal bleeding. She denies  leaking of fluid. She denies  vaginal discharge. She denies  uterine contraction activity. She denies  nausea and/or vomiting. She denies retaining fluid in her extremities. Overall feeling well. Having some normal discomforts of pregnancy. She has started her insulin. Brought BS logs and were faxed to [facility]. OBJECTIVE:   Blood pressure 125/71, pulse 89, temperature 97.4 °F (36.3 °C), temperature source Temporal, weight 185 lb 9.6 oz (84.2 kg), last menstrual period 2020, not currently breastfeeding. Baseline 130 moderate variability, +accels no decels  Ctx none      ASSESSMENT/PLAN:  1. Supervision of high risk pregnancy in third trimester  IUP at 36w6d  S=D  VTX presentation by BSUS  - IA FETAL NON-STRESS TEST    2. 36 weeks gestation of pregnancy      3. Insulin controlled gestational diabetes mellitus (GDM) in third trimester  -Reactive NST  - IA FETAL NON-STRESS TEST  -BS logs faxed to [facility]  -Continue insulin as prescribed Novolog 6 units before dinner NPH 10 units at bedtime  -Discussed IOL 39-39+6  4. GBS bacteriuria  -Tx in labor    5. Desires  (vaginal birth after ) trial  -Consent signed    6. History of DVT (deep vein thrombosis)  - Heparin Sodium, Porcine, (HEPARIN, PORCINE,) 61040 UNIT/ML injection; Inject 1 mL into the skin 2 times daily  Dispense: 14 vial; Refill: 2  - IA FETAL NON-STRESS TEST    7. Multigravida of advanced maternal age in third trimester        The problem list was reviewed and updated as needed. Duane Huang will monitor for fetal movements daily      Signs of labor to report were discussed. Birth preferences were discussed. Duane Huang was counseled regarding all of the above    Patient was seen with total faced to face time of 15 minutes. More than 50% of this visit was on counseling and education.

## 2020-11-11 ENCOUNTER — TELEPHONE (OUTPATIENT)
Dept: PERINATAL CARE | Age: 40
End: 2020-11-11

## 2020-11-11 NOTE — TELEPHONE ENCOUNTER
Blood sugars reviewed by Dr Catana Hammans. Pt informed of physician recommendation to increase dinner insulin to 10 units and to begin 6 units of Novolog before lunch. Pt stated will not start insulin before lunch she has returned to her usual lunch diet and will send blood sugars for review next week.

## 2020-11-12 PROBLEM — O24.414 INSULIN CONTROLLED GESTATIONAL DIABETES MELLITUS (GDM) IN THIRD TRIMESTER: Status: ACTIVE | Noted: 2020-10-13

## 2020-11-17 ENCOUNTER — ROUTINE PRENATAL (OUTPATIENT)
Dept: OBGYN CLINIC | Age: 40
End: 2020-11-17
Payer: COMMERCIAL

## 2020-11-17 VITALS
HEART RATE: 85 BPM | BODY MASS INDEX: 33.53 KG/M2 | TEMPERATURE: 97 F | WEIGHT: 185.7 LBS | DIASTOLIC BLOOD PRESSURE: 76 MMHG | SYSTOLIC BLOOD PRESSURE: 117 MMHG

## 2020-11-17 PROCEDURE — 0502F SUBSEQUENT PRENATAL CARE: CPT | Performed by: ADVANCED PRACTICE MIDWIFE

## 2020-11-17 PROCEDURE — 59025 FETAL NON-STRESS TEST: CPT | Performed by: ADVANCED PRACTICE MIDWIFE

## 2020-11-17 NOTE — PROGRESS NOTES
SUBJECTIVE:    Mendoza Monroy is here for her routine OB visit. She reports  fetal movement. She denies  vaginal bleeding. She denies  leaking of fluid. She denies  vaginal discharge. She occasional  uterine contraction activity. She denies  nausea and/or vomiting. She denies retaining fluid in her extremities. Overall feeling well has no concerns. OBJECTIVE:   Blood pressure 117/76, pulse 85, temperature 97 °F (36.1 °C), temperature source Temporal, weight 185 lb 11.2 oz (84.2 kg), last menstrual period 2020, not currently breastfeeding. basline 140 moderate variability, +accel and no decels  Ctx: rare  Sve 1/thick/bollatable   Vtx by BSUS    ASSESSMENT/PLAN:  1. High-risk pregnancy in third trimester  IUP at 37w6d  Vtx  Plan will be repeat SVE in 1 week and schedule for IOL 39-39+6    2. 37 weeks gestation of pregnancy      3. Desires  (vaginal birth after ) trial    4. GBS bacteriuria  tx in labor    5. Multigravida of advanced maternal age in third trimester  - CA FETAL NON-STRESS TEST    6. History of DVT (deep vein thrombosis)  -Continue Heparin BID  - CA FETAL NON-STRESS TEST    7. Insulin controlled gestational diabetes mellitus (GDM) in third trimester  -Reactive NST continue weekly  -BS log faxed to [facility]  -Pt currently taking 10 units Novolog before dinner and 10 units NPH before bed, did not start 6 units of Novolog with lunch  - CA FETAL NON-STRESS TEST      The problem list was reviewed and updated as needed. Mendoza Monroy will monitor for fetal movements daily      Mendoza Monroy was counseled regarding all of the above    Patient was seen with total faced to face time of 15 minutes. More than 50% of this visit was on counseling and education.

## 2020-11-18 ENCOUNTER — TELEPHONE (OUTPATIENT)
Dept: PERINATAL CARE | Age: 40
End: 2020-11-18

## 2020-11-18 NOTE — TELEPHONE ENCOUNTER
Patient was advised by phone that Dr. Paramjit Taylor has reviewed her blood sugar log on 11/18/2020, his insulin recommendations are Novolog 10 units before dinner, NPH insulin 16 units at bedtime. Patient clearly understands Dr. Maddy Nielsen insulin recommendations.

## 2020-11-23 ENCOUNTER — ROUTINE PRENATAL (OUTPATIENT)
Dept: OBGYN CLINIC | Age: 40
End: 2020-11-23
Payer: COMMERCIAL

## 2020-11-23 VITALS
SYSTOLIC BLOOD PRESSURE: 121 MMHG | WEIGHT: 183.1 LBS | HEART RATE: 80 BPM | HEIGHT: 62 IN | BODY MASS INDEX: 33.69 KG/M2 | TEMPERATURE: 96.9 F | DIASTOLIC BLOOD PRESSURE: 70 MMHG

## 2020-11-23 PROCEDURE — 0502F SUBSEQUENT PRENATAL CARE: CPT | Performed by: ADVANCED PRACTICE MIDWIFE

## 2020-11-23 PROCEDURE — 59025 FETAL NON-STRESS TEST: CPT | Performed by: ADVANCED PRACTICE MIDWIFE

## 2020-11-23 NOTE — PROGRESS NOTES
SUBJECTIVE:    Erika Feliz is here for her routine OB visit. She reports  fetal movement. She denies  vaginal bleeding. She denies  leaking of fluid. She denies  vaginal discharge. She reports some  uterine contraction activity. She denies  nausea and/or vomiting. She denies retaining fluid in her extremities. Has right sided ligament type pain. Worse this past week. OBJECTIVE:   Blood pressure 121/70, pulse 80, temperature 96.9 °F (36.1 °C), temperature source Temporal, height 5' 2\" (1.575 m), weight 183 lb 1.6 oz (83.1 kg), last menstrual period 02/26/2020, not currently breastfeeding. SVE  Soft 50/1cm/-2    Bishops Score      0        1         2         3        Patient  Dilation                clsd     1-2      3-4      5-6           1  Effacement           0-30   40-50  60-70  >80          1  Station                  -3        -2       -1/0     +1/+2       1  Consistency         Firm    Med     Soft                    2  Position                Post    Mid      Ant                     1                                                      Total score        6      ASSESSMENT:  IUP @ 38 weeks  S = D      PLAN:   Erika Feliz will monitor for fetal movements daily. The problem list was reviewed and updated as needed. GBS protocol and testing was discussed. GBS culture was not obtained. Results were reviewed. Signs of labor to report were discussed. Birth preferences were discussed. Erika Feliz was not counseled regarding Post Partum Depression. She has not decided on contraceptive choice. Erika Feliz was counseled regarding iOL before 40 weeks. She will think about it and let us know. More than 50% of this 20 min visit was on counseling and education.

## 2020-11-30 ENCOUNTER — HOSPITAL ENCOUNTER (INPATIENT)
Age: 40
LOS: 3 days | Discharge: HOME | End: 2020-12-03
Attending: OBSTETRICS & GYNECOLOGY | Admitting: OBSTETRICS & GYNECOLOGY
Payer: COMMERCIAL

## 2020-11-30 ENCOUNTER — ANESTHESIA (OUTPATIENT)
Dept: LABOR AND DELIVERY | Age: 40
End: 2020-11-30
Payer: COMMERCIAL

## 2020-11-30 ENCOUNTER — ANESTHESIA EVENT (OUTPATIENT)
Dept: LABOR AND DELIVERY | Age: 40
End: 2020-11-30
Payer: COMMERCIAL

## 2020-11-30 ENCOUNTER — APPOINTMENT (OUTPATIENT)
Dept: GENERAL RADIOLOGY | Age: 40
End: 2020-11-30
Payer: COMMERCIAL

## 2020-11-30 ENCOUNTER — APPOINTMENT (OUTPATIENT)
Dept: LABOR AND DELIVERY | Age: 40
End: 2020-11-30
Payer: COMMERCIAL

## 2020-11-30 VITALS — DIASTOLIC BLOOD PRESSURE: 70 MMHG | SYSTOLIC BLOOD PRESSURE: 114 MMHG | OXYGEN SATURATION: 98 %

## 2020-11-30 PROBLEM — O45.90 PLACENTAL ABRUPTION: Status: ACTIVE | Noted: 2020-11-30

## 2020-11-30 PROBLEM — O09.90 HRP (HIGH RISK PREGNANCY), UNSPECIFIED TRIMESTER: Status: ACTIVE | Noted: 2020-11-30

## 2020-11-30 LAB
-: NORMAL
ABO/RH: NORMAL
ABSOLUTE EOS #: 0.05 K/UL (ref 0–0.44)
ABSOLUTE EOS #: <0.03 K/UL (ref 0–0.44)
ABSOLUTE IMMATURE GRANULOCYTE: 0.05 K/UL (ref 0–0.3)
ABSOLUTE IMMATURE GRANULOCYTE: 0.09 K/UL (ref 0–0.3)
ABSOLUTE LYMPH #: 0.86 K/UL (ref 1.1–3.7)
ABSOLUTE LYMPH #: 1.04 K/UL (ref 1.1–3.7)
ABSOLUTE MONO #: 0.45 K/UL (ref 0.1–1.2)
ABSOLUTE MONO #: 0.67 K/UL (ref 0.1–1.2)
AMORPHOUS: NORMAL
AMPHETAMINE SCREEN URINE: NEGATIVE
ANTIBODY SCREEN: NEGATIVE
ARM BAND NUMBER: NORMAL
BACTERIA: NORMAL
BARBITURATE SCREEN URINE: NEGATIVE
BASOPHILS # BLD: 0 % (ref 0–2)
BASOPHILS # BLD: 0 % (ref 0–2)
BASOPHILS ABSOLUTE: <0.03 K/UL (ref 0–0.2)
BASOPHILS ABSOLUTE: <0.03 K/UL (ref 0–0.2)
BENZODIAZEPINE SCREEN, URINE: NEGATIVE
BILIRUBIN URINE: NEGATIVE
BUPRENORPHINE URINE: NORMAL
CANNABINOID SCREEN URINE: NEGATIVE
CASTS UA: NORMAL /LPF (ref 0–2)
CASTS UA: NORMAL /LPF (ref 0–2)
COCAINE METABOLITE, URINE: NEGATIVE
COLOR: YELLOW
COMMENT UA: ABNORMAL
CRYSTALS, UA: NORMAL /HPF
DIFFERENTIAL TYPE: ABNORMAL
DIFFERENTIAL TYPE: ABNORMAL
EOSINOPHILS RELATIVE PERCENT: 0 % (ref 1–4)
EOSINOPHILS RELATIVE PERCENT: 1 % (ref 1–4)
EPITHELIAL CELLS UA: NORMAL /HPF (ref 0–5)
EXPIRATION DATE: NORMAL
FIBRINOGEN: 468 MG/DL (ref 140–420)
GLUCOSE BLD-MCNC: 127 MG/DL (ref 65–105)
GLUCOSE BLD-MCNC: 64 MG/DL (ref 65–105)
GLUCOSE BLD-MCNC: 68 MG/DL (ref 65–105)
GLUCOSE BLD-MCNC: 84 MG/DL (ref 65–105)
GLUCOSE URINE: NEGATIVE
HCT VFR BLD CALC: 28.3 % (ref 36.3–47.1)
HCT VFR BLD CALC: 34.8 % (ref 36.3–47.1)
HEMOGLOBIN: 11.4 G/DL (ref 11.9–15.1)
HEMOGLOBIN: 9.1 G/DL (ref 11.9–15.1)
IMMATURE GRANULOCYTES: 1 %
IMMATURE GRANULOCYTES: 1 %
INR BLD: 0.9
KETONES, URINE: ABNORMAL
LACTIC ACID, WHOLE BLOOD: 3.6 MMOL/L (ref 0.7–2.1)
LACTIC ACID: ABNORMAL MMOL/L
LEUKOCYTE ESTERASE, URINE: ABNORMAL
LYMPHOCYTES # BLD: 14 % (ref 24–43)
LYMPHOCYTES # BLD: 6 % (ref 24–43)
MCH RBC QN AUTO: 25.9 PG (ref 25.2–33.5)
MCH RBC QN AUTO: 26.3 PG (ref 25.2–33.5)
MCHC RBC AUTO-ENTMCNC: 32.2 G/DL (ref 28.4–34.8)
MCHC RBC AUTO-ENTMCNC: 32.8 G/DL (ref 28.4–34.8)
MCV RBC AUTO: 80.2 FL (ref 82.6–102.9)
MCV RBC AUTO: 80.4 FL (ref 82.6–102.9)
MDMA URINE: NORMAL
METHADONE SCREEN, URINE: NEGATIVE
METHAMPHETAMINE, URINE: NORMAL
MONOCYTES # BLD: 4 % (ref 3–12)
MONOCYTES # BLD: 6 % (ref 3–12)
MUCUS: NORMAL
NITRITE, URINE: NEGATIVE
NRBC AUTOMATED: 0 PER 100 WBC
NRBC AUTOMATED: 0 PER 100 WBC
OPIATES, URINE: NEGATIVE
OTHER OBSERVATIONS UA: NORMAL
OXYCODONE SCREEN URINE: NEGATIVE
PARTIAL THROMBOPLASTIN TIME: 21.3 SEC (ref 20.5–30.5)
PDW BLD-RTO: 14.6 % (ref 11.8–14.4)
PDW BLD-RTO: 14.6 % (ref 11.8–14.4)
PH UA: 6 (ref 5–8)
PHENCYCLIDINE, URINE: NEGATIVE
PLATELET # BLD: 182 K/UL (ref 138–453)
PLATELET # BLD: 189 K/UL (ref 138–453)
PLATELET ESTIMATE: ABNORMAL
PLATELET ESTIMATE: ABNORMAL
PMV BLD AUTO: 10.1 FL (ref 8.1–13.5)
PMV BLD AUTO: 10.2 FL (ref 8.1–13.5)
PROPOXYPHENE, URINE: NORMAL
PROTEIN UA: ABNORMAL
PROTHROMBIN TIME: 9.3 SEC (ref 9–12)
RBC # BLD: 3.52 M/UL (ref 3.95–5.11)
RBC # BLD: 4.34 M/UL (ref 3.95–5.11)
RBC # BLD: ABNORMAL 10*6/UL
RBC # BLD: ABNORMAL 10*6/UL
RBC UA: NORMAL /HPF (ref 0–2)
RENAL EPITHELIAL, UA: NORMAL /HPF
SARS-COV-2, RAPID: NOT DETECTED
SARS-COV-2: NORMAL
SARS-COV-2: NORMAL
SEG NEUTROPHILS: 78 % (ref 36–65)
SEG NEUTROPHILS: 89 % (ref 36–65)
SEGMENTED NEUTROPHILS ABSOLUTE COUNT: 13.55 K/UL (ref 1.5–8.1)
SEGMENTED NEUTROPHILS ABSOLUTE COUNT: 5.81 K/UL (ref 1.5–8.1)
SOURCE: NORMAL
SPECIFIC GRAVITY UA: 1.02 (ref 1–1.03)
T. PALLIDUM, IGG: NONREACTIVE
TEST INFORMATION: NORMAL
TRICHOMONAS: NORMAL
TRICYCLIC ANTIDEPRESSANTS, UR: NORMAL
TURBIDITY: ABNORMAL
URINE HGB: NEGATIVE
UROBILINOGEN, URINE: NORMAL
WBC # BLD: 15.2 K/UL (ref 3.5–11.3)
WBC # BLD: 7.4 K/UL (ref 3.5–11.3)
WBC # BLD: ABNORMAL 10*3/UL
WBC # BLD: ABNORMAL 10*3/UL
WBC UA: NORMAL /HPF (ref 0–5)
YEAST: NORMAL

## 2020-11-30 PROCEDURE — 2709999900 HC NON-CHARGEABLE SUPPLY: Performed by: OBSTETRICS & GYNECOLOGY

## 2020-11-30 PROCEDURE — 6360000002 HC RX W HCPCS: Performed by: STUDENT IN AN ORGANIZED HEALTH CARE EDUCATION/TRAINING PROGRAM

## 2020-11-30 PROCEDURE — 85384 FIBRINOGEN ACTIVITY: CPT

## 2020-11-30 PROCEDURE — 6360000002 HC RX W HCPCS: Performed by: ADVANCED PRACTICE MIDWIFE

## 2020-11-30 PROCEDURE — 1220000000 HC SEMI PRIVATE OB R&B

## 2020-11-30 PROCEDURE — 6360000002 HC RX W HCPCS: Performed by: ANESTHESIOLOGY

## 2020-11-30 PROCEDURE — 6370000000 HC RX 637 (ALT 250 FOR IP): Performed by: STUDENT IN AN ORGANIZED HEALTH CARE EDUCATION/TRAINING PROGRAM

## 2020-11-30 PROCEDURE — 3700000000 HC ANESTHESIA ATTENDED CARE: Performed by: OBSTETRICS & GYNECOLOGY

## 2020-11-30 PROCEDURE — 83605 ASSAY OF LACTIC ACID: CPT

## 2020-11-30 PROCEDURE — 86780 TREPONEMA PALLIDUM: CPT

## 2020-11-30 PROCEDURE — 59510 CESAREAN DELIVERY: CPT | Performed by: OBSTETRICS & GYNECOLOGY

## 2020-11-30 PROCEDURE — 86900 BLOOD TYPING SEROLOGIC ABO: CPT

## 2020-11-30 PROCEDURE — 96374 THER/PROPH/DIAG INJ IV PUSH: CPT

## 2020-11-30 PROCEDURE — 2580000003 HC RX 258: Performed by: NURSE ANESTHETIST, CERTIFIED REGISTERED

## 2020-11-30 PROCEDURE — 85025 COMPLETE CBC W/AUTO DIFF WBC: CPT

## 2020-11-30 PROCEDURE — 2580000003 HC RX 258: Performed by: STUDENT IN AN ORGANIZED HEALTH CARE EDUCATION/TRAINING PROGRAM

## 2020-11-30 PROCEDURE — 3E033VJ INTRODUCTION OF OTHER HORMONE INTO PERIPHERAL VEIN, PERCUTANEOUS APPROACH: ICD-10-PCS | Performed by: OBSTETRICS & GYNECOLOGY

## 2020-11-30 PROCEDURE — 88307 TISSUE EXAM BY PATHOLOGIST: CPT

## 2020-11-30 PROCEDURE — 87086 URINE CULTURE/COLONY COUNT: CPT

## 2020-11-30 PROCEDURE — 80053 COMPREHEN METABOLIC PANEL: CPT

## 2020-11-30 PROCEDURE — 81001 URINALYSIS AUTO W/SCOPE: CPT

## 2020-11-30 PROCEDURE — 3609079900 HC CESAREAN SECTION: Performed by: OBSTETRICS & GYNECOLOGY

## 2020-11-30 PROCEDURE — 3700000001 HC ADD 15 MINUTES (ANESTHESIA): Performed by: OBSTETRICS & GYNECOLOGY

## 2020-11-30 PROCEDURE — 2500000003 HC RX 250 WO HCPCS: Performed by: NURSE ANESTHETIST, CERTIFIED REGISTERED

## 2020-11-30 PROCEDURE — 86850 RBC ANTIBODY SCREEN: CPT

## 2020-11-30 PROCEDURE — 74018 RADEX ABDOMEN 1 VIEW: CPT

## 2020-11-30 PROCEDURE — 7100000001 HC PACU RECOVERY - ADDTL 15 MIN: Performed by: OBSTETRICS & GYNECOLOGY

## 2020-11-30 PROCEDURE — 3700000025 EPIDURAL BLOCK: Performed by: ANESTHESIOLOGY

## 2020-11-30 PROCEDURE — 7100000000 HC PACU RECOVERY - FIRST 15 MIN: Performed by: OBSTETRICS & GYNECOLOGY

## 2020-11-30 PROCEDURE — 85610 PROTHROMBIN TIME: CPT

## 2020-11-30 PROCEDURE — 86403 PARTICLE AGGLUT ANTBDY SCRN: CPT

## 2020-11-30 PROCEDURE — U0002 COVID-19 LAB TEST NON-CDC: HCPCS

## 2020-11-30 PROCEDURE — 86901 BLOOD TYPING SEROLOGIC RH(D): CPT

## 2020-11-30 PROCEDURE — 6360000002 HC RX W HCPCS: Performed by: NURSE ANESTHETIST, CERTIFIED REGISTERED

## 2020-11-30 PROCEDURE — 36415 COLL VENOUS BLD VENIPUNCTURE: CPT

## 2020-11-30 PROCEDURE — 85730 THROMBOPLASTIN TIME PARTIAL: CPT

## 2020-11-30 PROCEDURE — 80307 DRUG TEST PRSMV CHEM ANLYZR: CPT

## 2020-11-30 PROCEDURE — 82947 ASSAY GLUCOSE BLOOD QUANT: CPT

## 2020-11-30 RX ORDER — OXYCODONE HYDROCHLORIDE AND ACETAMINOPHEN 5; 325 MG/1; MG/1
2 TABLET ORAL EVERY 4 HOURS PRN
Status: DISCONTINUED | OUTPATIENT
Start: 2020-12-01 | End: 2020-12-03 | Stop reason: HOSPADM

## 2020-11-30 RX ORDER — ONDANSETRON 2 MG/ML
4 INJECTION INTRAMUSCULAR; INTRAVENOUS EVERY 6 HOURS PRN
Status: DISCONTINUED | OUTPATIENT
Start: 2020-11-30 | End: 2020-11-30 | Stop reason: HOSPADM

## 2020-11-30 RX ORDER — SODIUM CHLORIDE, SODIUM LACTATE, POTASSIUM CHLORIDE, CALCIUM CHLORIDE 600; 310; 30; 20 MG/100ML; MG/100ML; MG/100ML; MG/100ML
INJECTION, SOLUTION INTRAVENOUS CONTINUOUS
Status: DISCONTINUED | OUTPATIENT
Start: 2020-11-30 | End: 2020-12-03 | Stop reason: HOSPADM

## 2020-11-30 RX ORDER — SODIUM CHLORIDE 9 MG/ML
INJECTION, SOLUTION INTRAVENOUS CONTINUOUS
Status: DISCONTINUED | OUTPATIENT
Start: 2020-11-30 | End: 2020-11-30

## 2020-11-30 RX ORDER — DEXTROSE MONOHYDRATE 25 G/50ML
12.5 INJECTION, SOLUTION INTRAVENOUS PRN
Status: DISCONTINUED | OUTPATIENT
Start: 2020-11-30 | End: 2020-11-30

## 2020-11-30 RX ORDER — CHLOROPROCAINE HYDROCHLORIDE 30 MG/ML
INJECTION, SOLUTION EPIDURAL; INFILTRATION; INTRACAUDAL; PERINEURAL PRN
Status: DISCONTINUED | OUTPATIENT
Start: 2020-11-30 | End: 2020-11-30 | Stop reason: SDUPTHER

## 2020-11-30 RX ORDER — VITAMIN A, ASCORBIC ACID, CHOLECALCIFEROL, .ALPHA.-TOCOPHEROL ACETATE, DL-, THIAMINE MONONITRATE, RIBOFLAVIN, NIACINAMIDE, PYRIDOXINE HYDROCHLORIDE, FOLIC ACID, CYANOCOBALAMIN, CALCIUM CARBONATE, IRON, ZINC OXIDE, AND CUPRIC OXIDE 4000; 120; 400; 22; 1.84; 3; 20; 10; 1; 12; 200; 29; 25; 2 [IU]/1; MG/1; [IU]/1; [IU]/1; MG/1; MG/1; MG/1; MG/1; MG/1; UG/1; MG/1; MG/1; MG/1; MG/1
1 TABLET ORAL DAILY
Status: DISCONTINUED | OUTPATIENT
Start: 2020-12-01 | End: 2020-12-03 | Stop reason: HOSPADM

## 2020-11-30 RX ORDER — SODIUM CHLORIDE 0.9 % (FLUSH) 0.9 %
10 SYRINGE (ML) INJECTION PRN
Status: DISCONTINUED | OUTPATIENT
Start: 2020-11-30 | End: 2020-12-03 | Stop reason: HOSPADM

## 2020-11-30 RX ORDER — ACETAMINOPHEN 500 MG
1000 TABLET ORAL EVERY 6 HOURS PRN
Status: DISCONTINUED | OUTPATIENT
Start: 2020-12-01 | End: 2020-12-03 | Stop reason: HOSPADM

## 2020-11-30 RX ORDER — ROPIVACAINE HYDROCHLORIDE 2 MG/ML
INJECTION, SOLUTION EPIDURAL; INFILTRATION; PERINEURAL PRN
Status: DISCONTINUED | OUTPATIENT
Start: 2020-11-30 | End: 2020-11-30 | Stop reason: SDUPTHER

## 2020-11-30 RX ORDER — LANOLIN 72 %
OINTMENT (GRAM) TOPICAL
Status: DISCONTINUED | OUTPATIENT
Start: 2020-11-30 | End: 2020-12-03 | Stop reason: HOSPADM

## 2020-11-30 RX ORDER — CEPHALEXIN 500 MG/1
500 CAPSULE ORAL EVERY 8 HOURS
Status: COMPLETED | OUTPATIENT
Start: 2020-12-01 | End: 2020-12-02

## 2020-11-30 RX ORDER — ONDANSETRON 2 MG/ML
INJECTION INTRAMUSCULAR; INTRAVENOUS PRN
Status: DISCONTINUED | OUTPATIENT
Start: 2020-11-30 | End: 2020-11-30 | Stop reason: SDUPTHER

## 2020-11-30 RX ORDER — IBUPROFEN 800 MG/1
800 TABLET ORAL EVERY 8 HOURS PRN
Status: DISCONTINUED | OUTPATIENT
Start: 2020-12-01 | End: 2020-12-03 | Stop reason: HOSPADM

## 2020-11-30 RX ORDER — SODIUM CHLORIDE 0.9 % (FLUSH) 0.9 %
10 SYRINGE (ML) INJECTION EVERY 12 HOURS SCHEDULED
Status: DISCONTINUED | OUTPATIENT
Start: 2020-11-30 | End: 2020-11-30 | Stop reason: HOSPADM

## 2020-11-30 RX ORDER — ROPIVACAINE HYDROCHLORIDE 2 MG/ML
INJECTION, SOLUTION EPIDURAL; INFILTRATION; PERINEURAL
Status: COMPLETED
Start: 2020-11-30 | End: 2020-11-30

## 2020-11-30 RX ORDER — OXYCODONE HYDROCHLORIDE AND ACETAMINOPHEN 5; 325 MG/1; MG/1
1 TABLET ORAL EVERY 4 HOURS PRN
Status: DISCONTINUED | OUTPATIENT
Start: 2020-12-01 | End: 2020-12-03 | Stop reason: HOSPADM

## 2020-11-30 RX ORDER — GLUCAGON 1 MG/ML
1 KIT INJECTION PRN
Status: DISCONTINUED | OUTPATIENT
Start: 2020-11-30 | End: 2020-11-30

## 2020-11-30 RX ORDER — SODIUM CHLORIDE, SODIUM LACTATE, POTASSIUM CHLORIDE, AND CALCIUM CHLORIDE .6; .31; .03; .02 G/100ML; G/100ML; G/100ML; G/100ML
500 INJECTION, SOLUTION INTRAVENOUS ONCE
Status: COMPLETED | OUTPATIENT
Start: 2020-11-30 | End: 2020-11-30

## 2020-11-30 RX ORDER — SODIUM CHLORIDE, SODIUM LACTATE, POTASSIUM CHLORIDE, CALCIUM CHLORIDE 600; 310; 30; 20 MG/100ML; MG/100ML; MG/100ML; MG/100ML
INJECTION, SOLUTION INTRAVENOUS CONTINUOUS PRN
Status: DISCONTINUED | OUTPATIENT
Start: 2020-11-30 | End: 2020-11-30 | Stop reason: SDUPTHER

## 2020-11-30 RX ORDER — ONDANSETRON 2 MG/ML
4 INJECTION INTRAMUSCULAR; INTRAVENOUS EVERY 6 HOURS PRN
Status: DISCONTINUED | OUTPATIENT
Start: 2020-11-30 | End: 2020-12-03 | Stop reason: HOSPADM

## 2020-11-30 RX ORDER — KETOROLAC TROMETHAMINE 30 MG/ML
30 INJECTION, SOLUTION INTRAMUSCULAR; INTRAVENOUS EVERY 6 HOURS
Status: COMPLETED | OUTPATIENT
Start: 2020-11-30 | End: 2020-12-01

## 2020-11-30 RX ORDER — SIMETHICONE 80 MG
80 TABLET,CHEWABLE ORAL EVERY 6 HOURS PRN
Status: DISCONTINUED | OUTPATIENT
Start: 2020-11-30 | End: 2020-12-03 | Stop reason: HOSPADM

## 2020-11-30 RX ORDER — LIDOCAINE HYDROCHLORIDE AND EPINEPHRINE 15; 5 MG/ML; UG/ML
INJECTION, SOLUTION EPIDURAL PRN
Status: DISCONTINUED | OUTPATIENT
Start: 2020-11-30 | End: 2020-11-30 | Stop reason: SDUPTHER

## 2020-11-30 RX ORDER — SODIUM CHLORIDE 0.9 % (FLUSH) 0.9 %
10 SYRINGE (ML) INJECTION PRN
Status: DISCONTINUED | OUTPATIENT
Start: 2020-11-30 | End: 2020-11-30 | Stop reason: HOSPADM

## 2020-11-30 RX ORDER — POLYETHYLENE GLYCOL 3350 17 G/17G
17 POWDER, FOR SOLUTION ORAL DAILY PRN
Status: DISCONTINUED | OUTPATIENT
Start: 2020-11-30 | End: 2020-12-03 | Stop reason: HOSPADM

## 2020-11-30 RX ORDER — NALBUPHINE HCL 10 MG/ML
5 AMPUL (ML) INJECTION EVERY 4 HOURS PRN
Status: DISCONTINUED | OUTPATIENT
Start: 2020-11-30 | End: 2020-11-30 | Stop reason: HOSPADM

## 2020-11-30 RX ORDER — DOCUSATE SODIUM 100 MG/1
100 CAPSULE, LIQUID FILLED ORAL 2 TIMES DAILY
Status: DISCONTINUED | OUTPATIENT
Start: 2020-11-30 | End: 2020-12-03 | Stop reason: HOSPADM

## 2020-11-30 RX ORDER — BISACODYL 10 MG
10 SUPPOSITORY, RECTAL RECTAL DAILY PRN
Status: DISCONTINUED | OUTPATIENT
Start: 2020-11-30 | End: 2020-12-03 | Stop reason: HOSPADM

## 2020-11-30 RX ORDER — METRONIDAZOLE 500 MG/1
500 TABLET ORAL EVERY 8 HOURS
Status: COMPLETED | OUTPATIENT
Start: 2020-12-01 | End: 2020-12-02

## 2020-11-30 RX ORDER — MORPHINE SULFATE 1 MG/ML
INJECTION, SOLUTION EPIDURAL; INTRATHECAL; INTRAVENOUS PRN
Status: DISCONTINUED | OUTPATIENT
Start: 2020-11-30 | End: 2020-11-30 | Stop reason: SDUPTHER

## 2020-11-30 RX ORDER — DEXTROSE MONOHYDRATE 50 MG/ML
100 INJECTION, SOLUTION INTRAVENOUS PRN
Status: DISCONTINUED | OUTPATIENT
Start: 2020-11-30 | End: 2020-11-30

## 2020-11-30 RX ORDER — ACETAMINOPHEN 500 MG
1000 TABLET ORAL EVERY 6 HOURS PRN
Status: DISCONTINUED | OUTPATIENT
Start: 2020-11-30 | End: 2020-11-30 | Stop reason: HOSPADM

## 2020-11-30 RX ORDER — EPHEDRINE SULFATE/0.9% NACL/PF 50 MG/5 ML
SYRINGE (ML) INTRAVENOUS PRN
Status: DISCONTINUED | OUTPATIENT
Start: 2020-11-30 | End: 2020-11-30 | Stop reason: SDUPTHER

## 2020-11-30 RX ORDER — CEFAZOLIN SODIUM 1 G/50ML
1 INJECTION, SOLUTION INTRAVENOUS EVERY 8 HOURS
Status: DISCONTINUED | OUTPATIENT
Start: 2020-11-30 | End: 2020-11-30 | Stop reason: HOSPADM

## 2020-11-30 RX ORDER — NALOXONE HYDROCHLORIDE 0.4 MG/ML
0.4 INJECTION, SOLUTION INTRAMUSCULAR; INTRAVENOUS; SUBCUTANEOUS PRN
Status: DISCONTINUED | OUTPATIENT
Start: 2020-11-30 | End: 2020-11-30 | Stop reason: HOSPADM

## 2020-11-30 RX ORDER — TRISODIUM CITRATE DIHYDRATE AND CITRIC ACID MONOHYDRATE 500; 334 MG/5ML; MG/5ML
30 SOLUTION ORAL ONCE
Status: DISCONTINUED | OUTPATIENT
Start: 2020-11-30 | End: 2020-11-30

## 2020-11-30 RX ORDER — DIPHENHYDRAMINE HYDROCHLORIDE 50 MG/ML
25 INJECTION INTRAMUSCULAR; INTRAVENOUS EVERY 6 HOURS PRN
Status: DISCONTINUED | OUTPATIENT
Start: 2020-11-30 | End: 2020-12-03 | Stop reason: HOSPADM

## 2020-11-30 RX ORDER — NALOXONE HYDROCHLORIDE 0.4 MG/ML
0.4 INJECTION, SOLUTION INTRAMUSCULAR; INTRAVENOUS; SUBCUTANEOUS PRN
Status: DISCONTINUED | OUTPATIENT
Start: 2020-11-30 | End: 2020-12-03 | Stop reason: HOSPADM

## 2020-11-30 RX ORDER — ONDANSETRON 2 MG/ML
4 INJECTION INTRAMUSCULAR; INTRAVENOUS EVERY 6 HOURS PRN
Status: DISCONTINUED | OUTPATIENT
Start: 2020-11-30 | End: 2020-11-30 | Stop reason: SDUPTHER

## 2020-11-30 RX ORDER — NICOTINE POLACRILEX 4 MG
15 LOZENGE BUCCAL PRN
Status: DISCONTINUED | OUTPATIENT
Start: 2020-11-30 | End: 2020-11-30

## 2020-11-30 RX ADMIN — SODIUM CHLORIDE: 9 INJECTION, SOLUTION INTRAVENOUS at 15:15

## 2020-11-30 RX ADMIN — Medication 10 MG: at 19:20

## 2020-11-30 RX ADMIN — CHLOROPROCAINE HYDROCHLORIDE 5 ML: 30 INJECTION, SOLUTION EPIDURAL; INFILTRATION; INTRACAUDAL; PERINEURAL at 18:48

## 2020-11-30 RX ADMIN — PHENYLEPHRINE HYDROCHLORIDE 200 MCG: 10 INJECTION INTRAVENOUS at 18:52

## 2020-11-30 RX ADMIN — ONDANSETRON 4 MG: 2 INJECTION INTRAMUSCULAR; INTRAVENOUS at 19:17

## 2020-11-30 RX ADMIN — SODIUM CHLORIDE, POTASSIUM CHLORIDE, SODIUM LACTATE AND CALCIUM CHLORIDE 500 ML: 600; 310; 30; 20 INJECTION, SOLUTION INTRAVENOUS at 20:20

## 2020-11-30 RX ADMIN — SODIUM CHLORIDE, POTASSIUM CHLORIDE, SODIUM LACTATE AND CALCIUM CHLORIDE: 600; 310; 30; 20 INJECTION, SOLUTION INTRAVENOUS at 18:19

## 2020-11-30 RX ADMIN — Medication 10 ML/HR: at 14:57

## 2020-11-30 RX ADMIN — CEFAZOLIN 2 G: 330 INJECTION, POWDER, FOR SOLUTION INTRAMUSCULAR; INTRAVENOUS at 08:56

## 2020-11-30 RX ADMIN — Medication: at 09:30

## 2020-11-30 RX ADMIN — PHENYLEPHRINE HYDROCHLORIDE 100 MCG: 10 INJECTION INTRAVENOUS at 18:57

## 2020-11-30 RX ADMIN — ROPIVACAINE HYDROCHLORIDE 5 ML: 2 INJECTION, SOLUTION EPIDURAL; INFILTRATION at 14:48

## 2020-11-30 RX ADMIN — CEFAZOLIN SODIUM 1 G: 1 INJECTION, SOLUTION INTRAVENOUS at 17:06

## 2020-11-30 RX ADMIN — Medication 500 MG: at 19:06

## 2020-11-30 RX ADMIN — Medication 334 ML/HR: at 18:50

## 2020-11-30 RX ADMIN — CHLOROPROCAINE HYDROCHLORIDE 10 ML: 30 INJECTION, SOLUTION EPIDURAL; INFILTRATION; INTRACAUDAL; PERINEURAL at 18:52

## 2020-11-30 RX ADMIN — PHENYLEPHRINE HYDROCHLORIDE 200 MCG: 10 INJECTION INTRAVENOUS at 18:42

## 2020-11-30 RX ADMIN — KETOROLAC TROMETHAMINE 30 MG: 30 INJECTION, SOLUTION INTRAMUSCULAR at 21:24

## 2020-11-30 RX ADMIN — LIDOCAINE HYDROCHLORIDE,EPINEPHRINE BITARTRATE 3 ML: 15; .005 INJECTION, SOLUTION EPIDURAL; INFILTRATION; INTRACAUDAL; PERINEURAL at 14:45

## 2020-11-30 RX ADMIN — PHENYLEPHRINE HYDROCHLORIDE 100 MCG: 10 INJECTION INTRAVENOUS at 19:06

## 2020-11-30 RX ADMIN — ROPIVACAINE HYDROCHLORIDE 5 ML: 2 INJECTION, SOLUTION EPIDURAL; INFILTRATION at 14:53

## 2020-11-30 RX ADMIN — Medication 10 MG: at 19:12

## 2020-11-30 RX ADMIN — SODIUM CHLORIDE: 9 INJECTION, SOLUTION INTRAVENOUS at 08:57

## 2020-11-30 RX ADMIN — DOCUSATE SODIUM 100 MG: 100 CAPSULE, LIQUID FILLED ORAL at 21:25

## 2020-11-30 RX ADMIN — CHLOROPROCAINE HYDROCHLORIDE 10 ML: 30 INJECTION, SOLUTION EPIDURAL; INFILTRATION; INTRACAUDAL; PERINEURAL at 18:40

## 2020-11-30 RX ADMIN — MORPHINE SULFATE 2 MG: 1 INJECTION, SOLUTION EPIDURAL; INTRATHECAL; INTRAVENOUS at 18:54

## 2020-11-30 ASSESSMENT — PULMONARY FUNCTION TESTS
PIF_VALUE: 0
PIF_VALUE: 1
PIF_VALUE: 0
PIF_VALUE: 1
PIF_VALUE: 0
PIF_VALUE: 1
PIF_VALUE: 1
PIF_VALUE: 0
PIF_VALUE: 1
PIF_VALUE: 0
PIF_VALUE: 1
PIF_VALUE: 0
PIF_VALUE: 1
PIF_VALUE: 0
PIF_VALUE: 1
PIF_VALUE: 0

## 2020-11-30 ASSESSMENT — PAIN SCALES - GENERAL
PAINLEVEL_OUTOF10: 2
PAINLEVEL_OUTOF10: 2
PAINLEVEL_OUTOF10: 3

## 2020-11-30 NOTE — PROGRESS NOTES
[facility] Labor Note    SUBJECTIVE:    Patient is working well with labor. Comfort measures include epidural, assisting with relief. Support system helpful. Pt requests SVE. OBJECTIVE:     VS:  height is 5' 2\" (1.575 m) and weight is 183 lb (83 kg). Her oral temperature is 97.7 °F (36.5 °C). Her blood pressure is 117/69 and her pulse is 77. Her respiration is 16 and oxygen saturation is 99%. FHT:    Baseline:  130   Variability: moderate              Accels: present   Decels: Variable: moderate       Scalp electrode: No    Contraction pattern: regular, TOCO not tracing well                                 Frequency: q2-3min                                 Duration: 60-80sec                                 Intensity: strong                                 Pitocin: 10 mU                                 IUPC:  No    Cervix:             Dilation: 8cm            Effacement: 80            Station: +1            Position: anterior            Consistency: soft    Status of membranes: SROM 1615    Pain control: epidural    Maternal status (hydration, fatigue, voids): coping well with epidural, continuing to drink water     ASSESSMENT/PLAN:  Active phase labor  FHR Category 2  GDMA2  Blood sugars stable  Coping well with labor  Continue support    Report given to LELO Arias

## 2020-11-30 NOTE — ANESTHESIA PROCEDURE NOTES
Epidural Block    Patient location during procedure: OB  Start time: 11/30/2020 2:40 PM  End time: 11/30/2020 2:44 PM  Reason for block: labor epidural  Staffing  Anesthesiologist: Renu Huang MD  Resident/CRNA: MAIRA Hardwick - CRNA  Performed: resident/CRNA   Preanesthetic Checklist  Completed: patient identified, site marked, surgical consent, pre-op evaluation, timeout performed, IV checked, risks and benefits discussed, monitors and equipment checked, anesthesia consent given, oxygen available and patient being monitored  Epidural  Patient position: sitting  Prep: Betadine  Patient monitoring: continuous pulse ox and frequent blood pressure checks  Approach: midline  Location: lumbar (1-5)  Injection technique: AMA air and AMA saline  Provider prep: mask and sterile gloves  Needle  Needle type: Tuohy   Needle gauge: 17 G  Needle length: 3.5 in  Needle insertion depth: 5 cm  Catheter type: end hole  Test dose: negative  Assessment  Hemodynamics: stable  Attempts: 1

## 2020-11-30 NOTE — ANESTHESIA PRE PROCEDURE
SECTION  2014    d/t placental abruption       Social History:    Social History     Tobacco Use    Smoking status: Never Smoker    Smokeless tobacco: Never Used   Substance Use Topics    Alcohol use: Not Currently                                Counseling given: Not Answered      Vital Signs (Current):   Vitals:    20 1030 20 1130 20 1230 20 1330   BP: 117/72 125/67 130/77 115/72   Pulse: 90 89 98 90   Resp: 16 16 16 16   Temp:       TempSrc:       Weight:       Height:                                                  BP Readings from Last 3 Encounters:   20 115/72   20 121/70   20 117/76       NPO Status:                                                                                 BMI:   Wt Readings from Last 3 Encounters:   20 183 lb (83 kg)   20 183 lb 1.6 oz (83.1 kg)   20 185 lb 11.2 oz (84.2 kg)     Body mass index is 33.47 kg/m².     CBC:   Lab Results   Component Value Date    WBC 7.4 2020    RBC 4.34 2020    HGB 11.4 2020    HCT 34.8 2020    MCV 80.2 2020    RDW 14.6 2020     2020       CMP:   Lab Results   Component Value Date    GLUCOSE 208 2020       POC Tests:   Recent Labs     20  1117   POCGLU 64*       Coags:   Lab Results   Component Value Date    PROTIME 9.3 2020    INR 0.9 2020    APTT 23.2 2020       HCG (If Applicable):   Lab Results   Component Value Date    PREGTESTUR positive 2020        ABGs: No results found for: PHART, PO2ART, MIJ7OYY, SMW1MPN, BEART, C3WIPYQU     Type & Screen (If Applicable):  No results found for: LABABO, LABRH    Drug/Infectious Status (If Applicable):  No results found for: HIV, HEPCAB    COVID-19 Screening (If Applicable):   Lab Results   Component Value Date    COVID19 Not Detected 2020         Anesthesia Evaluation  Patient summary reviewed and Nursing notes reviewed no history of anesthetic complications:   Airway: Mallampati: II  TM distance: >3 FB   Neck ROM: full  Mouth opening: > = 3 FB Dental: normal exam         Pulmonary:Negative Pulmonary ROS                              Cardiovascular:Negative CV ROS            Rhythm: regular  Rate: normal                    Neuro/Psych:   Negative Neuro/Psych ROS              GI/Hepatic/Renal: Neg GI/Hepatic/Renal ROS            Endo/Other:    (+) DiabetesType II DM, well controlled, using insulin, . Abdominal:           Vascular: negative vascular ROS. Anesthesia Plan      epidural     ASA 2           MIPS: Postoperative opioids intended. Anesthetic plan and risks discussed with patient. Use of blood products discussed with patient whom. Plan discussed with attending.     Attending anesthesiologist reviewed and agrees with [address], APRN - CRNA   11/30/2020

## 2020-11-30 NOTE — PROGRESS NOTES
[facility] Labor Note    SUBJECTIVE:    Patient is working well with labor. Comfort measures include epidural and essential oils, assisting with relief. Support system helpful. Pt reports increased pressure and feeling shaky. OBJECTIVE:     VS:  height is 5' 2\" (1.575 m) and weight is 183 lb (83 kg). Her oral temperature is 97.7 °F (36.5 °C). Her blood pressure is 123/77 and her pulse is 85. Her respiration is 16.      FHT:    Baseline: 130   Variability: moderate              Accels: present   Decels: late, Variable: moderate, resolved      Scalp electrode: No    Contraction pattern: regular                                 Frequency: q2-3min                                 Duration: 60-80 sec                                 Intensity: strong                                 Pitocin: 10                                 IUPC:  No    Cervix:            Dilation: 6cm            Effacement: 70%            Station: 0            Position: posterior            Consistency: soft    Status of membranes: SROM at 1615    Pain control: epidural in place, pain well controlled     Maternal status (hydration, fatigue, voids): continuing to drink water and in good spirits     ASSESSMENT/PLAN:  Active labor  -continue to monitor and reposition   -anticipate   GDMA2  Glucose 127 at 1434  GBS bacteruria  - Ancef 2gm received at 0856  -Ancef 1gm received at 1706  FHR Category 2 resolved to Cat 1   Coping well with labor  Continue support    Dr. Mcduffie An updated

## 2020-11-30 NOTE — H&P
[facility] Obstetrics History and Physical  2020  9:23 AM      SUBJECTIVE:    CHIEF COMPLAINT:  IOL at [id number]    HISTORY OF PRESENT ILLNESS:      The patient is a 36 y.o. female at 38w11d. Presenting for scheduled IOL r/t GDM2 and hx of  (G3), 4 successful VBACs since. Pt denies HA, LOF, and vag bleeding, reports + FM. Christine Pineda presents with a chief complaint as above and is being admitted for induction    Course of pregnancy complicated by:     Patient Active Problem List   Diagnosis    History of  section    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 ([facility] [address]    History of DVT (deep vein thrombosis)    High-risk pregnancy    Grand multiparity    Advanced maternal age in multigravida    History of placental abruption (G3)    GBS bacteriuria    Low-lying placenta: RESOLVED    History of vacuum extraction assisted delivery    Desires  (vaginal birth after ) trial    Uterine fibroid during pregnancy, antepartum    History of postpartum depression ([facility])    Obesity in pregnancy, antepartum    Insulin controlled gestational diabetes mellitus (GDM) in third trimester    RUKHSANA (amniotic fluid index) borderline low    Fetal microcephaly    Breech presentation, no version    HRP (high risk pregnancy), unspecified trimester         OBJECTIVE:     Estimated Due Date:   Estimated Date of Delivery: 20   Patient's last menstrual period was 2020. Confirmed by: 12w0d       PRENATAL LABS:    Blood Type/Rh: A pos  Antibody Screen: negative  Hemoglobin, Hematocrit, Platelets: 35.3 / 79.1 / 251  Rubella: immune  T.  Pallidum, IgG: non-reactive   Hepatitis B Surface Antigen: non-reactive   HIV: non-reactive   Sickle Cell Screen: not available  Gonorrhea: not available  Chlamydia: negative  Urine culture: positive - GBS, date: 20    1 hour Glucose Tolerance Test: 208      Group B Strep: positive - urine culture 20  Cystic Fibrosis Screen: not available  First Trimester Screen: low risk, patient declined, not available  MSAFP/Multiple Markers: not available  Non-Invasive Prenatal Testing: patient declined     Steroids:  no    PAST OB HISTORY:  OB History    Para Term  AB Living   8 7 7 0 0 7   SAB TAB Ectopic Molar Multiple Live Births   0 0 0 0 0 7      # Outcome Date GA Lbr Bear/2nd Weight Sex Delivery Anes PTL Lv   8 Current            7 Term 19 38w4d 01:08 / 00:18 7 lb 5.3 oz (3.325 kg) F  EPI N GINA      Name: Autumn Estrella: 8  Apgar5: 9   6 Term 17 38w3d 01:33 / 00:10 6 lb 13.9 oz (3.115 kg) M  EPI N GINA      Name: Mari Rivera: 9  Apgar5: 9   5 Term 16 39w3d 06:42 / 00:15 7 lb 5.8 oz (3.34 kg) F  EPI N GINA      Apgar1: 8  Apgar5: 9   4 Term 08/10/15 38w5d  6 lb 4 oz (2.835 kg) F Vag-Vacuum EPI N GINA      Name: Burgess Manriquez   3 Term 14 37w3d  6 lb 8 oz (2.948 kg) F CS-Unspec Spinal N GINA      Complications: Placental abruption   2 Term 08/15/09 39w0d  6 lb 4 oz (2.835 kg) F Vag-Spont EPI N GINA      Birth Comments: Length of Labor- 12.00   1 Term 01/10/08 38w0d  5 lb 12 oz (2.608 kg) F Vag-Spont EPI N GINA      Birth Comments: Length of Labor- 12.00       Past Medical History:        Diagnosis Date    Diet controlled gestational diabetes mellitus (GDM) in third trimester 10/13/2020    DVT (deep venous thrombosis) ([facility] [address]     while on OCPs;  Lovenox during pregnancies    H/O Placental abruption     G3- primary cesearean    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 ([facility] [address]     Hx successful  (vaginal birth after )     x 4    Postpartum depression     baby #7    Thyroid cyst     fluid drained from cyst ; all benign       Past Surgical History:        Procedure Laterality Date     SECTION  2014    d/t placental abruption       Allergies:    Penicillins and Vancomycin    Social History:    Social History     Socioeconomic History    Marital status:      Spouse name: Not on file    Number of children: Not on file    Years of education: Not on file    Highest education level: Not on file   Occupational History    Not on file   Social Needs    Financial resource strain: Not on file    Food insecurity     Worry: Not on file     Inability: Not on file    Transportation needs     Medical: Not on file     Non-medical: Not on file   Tobacco Use    Smoking status: Never Smoker    Smokeless tobacco: Never Used   Substance and Sexual Activity    Alcohol use: Not Currently    Drug use: Never    Sexual activity: Yes     Partners: Male   Lifestyle    Physical activity     Days per week: Not on file     Minutes per session: Not on file    Stress: Not on file   Relationships    Social connections     Talks on phone: Not on file     Gets together: Not on file     Attends Synagogue service: Not on file     Active member of club or organization: Not on file     Attends meetings of clubs or organizations: Not on file     Relationship status: Not on file    Intimate partner violence     Fear of current or ex partner: Not on file     Emotionally abused: Not on file     Physically abused: Not on file     Forced sexual activity: Not on file   Other Topics Concern    Not on file   Social History Narrative    Not on file       Family History:       Adopted: Yes       Medications Prior to Admission:  Medications Prior to Admission: insulin NPH (HUMULIN N;NOVOLIN N) 100 UNIT/ML injection vial, Inject 16 Units into the skin nightly  insulin regular (HUMULIN R;NOVOLIN R) 100 UNIT/ML injection, Inject 10 Units into the skin Daily with supper  Ferrous Sulfate (IRON PO), Take by mouth  Magnesium 125 MG CAPS, Take 1 capsule by mouth daily  enoxaparin (LOVENOX) 40 MG/0.4ML injection, Inject into the skin daily   vitamin B-6 (PYRIDOXINE) 100 MG tablet, Take 100 mg by mouth daily  vitamin B-12 (CYANOCOBALAMIN) 500 MCG tablet, Take 500 mcg by mouth daily  aspirin 81 MG EC tablet, Take 81 mg by mouth daily  Prenatal Vit-Fe Fumarate-FA (PRENATAL VITAMIN PO), Take 1 tablet by mouth daily  FOLIC ACID PO, Take 1 tablet by mouth daily  VITAMIN D PO, Take 1 tablet by mouth daily  Heparin Sodium, Porcine, (HEPARIN, PORCINE,) 91026 UNIT/ML injection, Inject 1 mL into the skin 2 times daily  INSULIN SYRINGE 1CC/29G ([employer] INS SYRINGE 1CC/29G) 29G X 1/2\" 1 ML MISC, 1 each by Does not apply route 2 times daily  blood glucose monitor kit and supplies, Dispense sufficient amount for QID testing needs for 30 days plus 2 refills. Dispense all needed supplies to include: monitor, strips, lancing device, lancets, control solutions, alcohol swabs. REVIEW OF SYSTEMS:    CONSTITUTIONAL:  Denies fever, chills, malaise  RESPIRATORY:  Denies SOB, wheezing, URI  CARDIOVASCULAR:  Denies edema, palpitations, syncope  GASTROINTESTINAL:  Denies nausea, vomiting, diarrhea  GENITOURINARY: Denies hematuria, frequency, dysuria  NEUROLOGICAL:  Denies migraine headaches, tingling, numbness  BEHAVIOR/PSYCH:  Denies depression, anxiety, mood changes    PHYSICAL EXAM:     Vitals:    11/30/20 0631   BP: 126/79   Pulse: 123   Resp: 16   Temp: 97.7 °F (36.5 °C)       General appearance:  Alert, no apparent distress, and cooperative  Skin:  Warm, dry, no rashes or erythema  Neurologic:  alert, oriented, normal speech and gait, normal reflexes  Lungs:  No increased work of breathing, good air exchange, clear to auscultation bilaterally, no crackles or wheezing  Heart:  regular rate and rhythm and no murmur   Breast:  Deferred   Abdomen:  soft, non-tender, no right upper quadrant tenderness, no CVA tenderness.   Gravid and consistent with her gestational age,  Uterus non-tender, Bedside US performed by Dr. Garcia Carp cephalic presentation, Leopolds maneuver EFW 6.5 lbs no signs of abruption and no signs of chorioamnionitis  Extremities:  no calf tenderness, non edematous, DTRs: normal    Pelvic Exam:               Proven to 7lbs 5.8 oz    Speculum: N/A  Cervix Check: 1 cm dilated, 50 % effaced, -2 station, posterior position, soft consistency, Cephalic   Bishops Score: 5    MEMBRANES:  Intact                            FETAL HEART RATE:       Baseline: 130     Variability: moderate     Accelerations: present     Decelerations: absent            CONTRACTIONS: Frequency: q10-20 minutes                           Duration: 60-80 sec                           Intensity: mild       ASSESSMENT/PLAN:  Marzena Ortiz is a 36 y.o. female  [id number] At 38w11d  Labor: scheduled IOL for GDM2 and Hx of   - Admit, routine labor orders  - Written consent for UDS obtained, specimen collected  - Consent for Covid testing obtained   IOL   -Pitocin, orders placed  -Vtx on admissio    GDMA2  - high dose sliding scale insulin  -2 hour BS monitoring    Hx of DVT and 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  - d/c heparin,  -Last dose was 20  - Plan for lovenox 24 hours after delivery   GBS bacteruria   -PCN allergy, Low risk   - Ancef 2gm once, 1g q8h     H/o of LTVCS with  x4    AMA    Uterine fibriod  -Fundal/right lateral 2.74x3.11x1.89 cm    History of depession  -Stable not on meds  -Monitor PP    FHR: Category 1, positive accels, no decels      Discussed patient with Dr. Marko Weaver agreeable with POC

## 2020-11-30 NOTE — PROGRESS NOTES
[facility] Labor Note    SUBJECTIVE:    Patient is working well with labor. Comfort measures include position changes and focused breathing, assisting with relief. Support system helpful,  at bedside. Pt sitting up in bed, states ctx's are getting stronger and desires an epidural at this time. OBJECTIVE:     VS:  height is 5' 2\" (1.575 m) and weight is 183 lb (83 kg). Her oral temperature is 97.7 °F (36.5 °C). Her blood pressure is 115/72 and her pulse is 90. Her respiration is 16. FHT:    Baseline: 140   Variability: moderate              Accels: present   Decels: Early      Scalp electrode: No    Contraction pattern: irregular                                 Frequency: q2-5min                                 Duration: 60-80 sec                                 Intensity: moderate                                 Pitocin: at 6 mU                                 IUPC:  No    Cervix:             Dilation: 2            Effacement: 50            Station: -2 but 0 station with contraction             Position: posterior            Consistency:  soft    Status of membranes: intact    Pain control: focused breathing through ctx's, position changes. Pt desires epidural at this time. Maternal status (hydration, fatigue, voids): Patient coping well with labor, continuing to eat regular diet and drinking water. Sitting up in bed in good spirit.      ASSESSMENT/PLAN:  Pt desires epidural at this time  -orders placed  Blood sugars stable (84 at 0914, 64 at 1117)  FHR Category 1, positive accels, early decel  Coping well with labor  Continue support

## 2020-12-01 LAB
ABSOLUTE EOS #: 0.06 K/UL (ref 0–0.44)
ABSOLUTE IMMATURE GRANULOCYTE: 0.03 K/UL (ref 0–0.3)
ABSOLUTE LYMPH #: 1.04 K/UL (ref 1.1–3.7)
ABSOLUTE MONO #: 0.59 K/UL (ref 0.1–1.2)
ALBUMIN SERPL-MCNC: 2.5 G/DL (ref 3.5–5.2)
ALBUMIN/GLOBULIN RATIO: 1 (ref 1–2.5)
ALP BLD-CCNC: 89 U/L (ref 35–104)
ALT SERPL-CCNC: 6 U/L (ref 5–33)
ANION GAP SERPL CALCULATED.3IONS-SCNC: 12 MMOL/L (ref 9–17)
AST SERPL-CCNC: 12 U/L
BASOPHILS # BLD: 0 % (ref 0–2)
BASOPHILS ABSOLUTE: <0.03 K/UL (ref 0–0.2)
BILIRUB SERPL-MCNC: <0.1 MG/DL (ref 0.3–1.2)
BUN BLDV-MCNC: 9 MG/DL (ref 6–20)
BUN/CREAT BLD: ABNORMAL (ref 9–20)
CALCIUM SERPL-MCNC: 8.4 MG/DL (ref 8.6–10.4)
CHLORIDE BLD-SCNC: 106 MMOL/L (ref 98–107)
CO2: 14 MMOL/L (ref 20–31)
CREAT SERPL-MCNC: 0.59 MG/DL (ref 0.5–0.9)
CULTURE: ABNORMAL
CULTURE: ABNORMAL
DIFFERENTIAL TYPE: ABNORMAL
EOSINOPHILS RELATIVE PERCENT: 1 % (ref 1–4)
GFR AFRICAN AMERICAN: >60 ML/MIN
GFR NON-AFRICAN AMERICAN: >60 ML/MIN
GFR SERPL CREATININE-BSD FRML MDRD: ABNORMAL ML/MIN/{1.73_M2}
GFR SERPL CREATININE-BSD FRML MDRD: ABNORMAL ML/MIN/{1.73_M2}
GLUCOSE BLD-MCNC: 134 MG/DL (ref 65–105)
GLUCOSE BLD-MCNC: 241 MG/DL (ref 70–99)
HCT VFR BLD CALC: 26.9 % (ref 36.3–47.1)
HEMOGLOBIN: 8.4 G/DL (ref 11.9–15.1)
IMMATURE GRANULOCYTES: 0 %
LACTIC ACID, WHOLE BLOOD: 1.1 MMOL/L (ref 0.7–2.1)
LACTIC ACID: NORMAL MMOL/L
LYMPHOCYTES # BLD: 11 % (ref 24–43)
Lab: ABNORMAL
MCH RBC QN AUTO: 26 PG (ref 25.2–33.5)
MCHC RBC AUTO-ENTMCNC: 31.2 G/DL (ref 28.4–34.8)
MCV RBC AUTO: 83.3 FL (ref 82.6–102.9)
MONOCYTES # BLD: 6 % (ref 3–12)
NRBC AUTOMATED: 0 PER 100 WBC
PDW BLD-RTO: 14.8 % (ref 11.8–14.4)
PLATELET # BLD: 162 K/UL (ref 138–453)
PLATELET ESTIMATE: ABNORMAL
PMV BLD AUTO: 10.1 FL (ref 8.1–13.5)
POTASSIUM SERPL-SCNC: 3.6 MMOL/L (ref 3.7–5.3)
RBC # BLD: 3.23 M/UL (ref 3.95–5.11)
RBC # BLD: ABNORMAL 10*6/UL
SEG NEUTROPHILS: 83 % (ref 36–65)
SEGMENTED NEUTROPHILS ABSOLUTE COUNT: 8.21 K/UL (ref 1.5–8.1)
SODIUM BLD-SCNC: 132 MMOL/L (ref 135–144)
SPECIMEN DESCRIPTION: ABNORMAL
TOTAL PROTEIN: 4.9 G/DL (ref 6.4–8.3)
WBC # BLD: 10 K/UL (ref 3.5–11.3)
WBC # BLD: ABNORMAL 10*3/UL

## 2020-12-01 PROCEDURE — 85025 COMPLETE CBC W/AUTO DIFF WBC: CPT

## 2020-12-01 PROCEDURE — 82947 ASSAY GLUCOSE BLOOD QUANT: CPT

## 2020-12-01 PROCEDURE — 6370000000 HC RX 637 (ALT 250 FOR IP): Performed by: STUDENT IN AN ORGANIZED HEALTH CARE EDUCATION/TRAINING PROGRAM

## 2020-12-01 PROCEDURE — 1220000000 HC SEMI PRIVATE OB R&B

## 2020-12-01 PROCEDURE — 2580000003 HC RX 258: Performed by: STUDENT IN AN ORGANIZED HEALTH CARE EDUCATION/TRAINING PROGRAM

## 2020-12-01 PROCEDURE — 36415 COLL VENOUS BLD VENIPUNCTURE: CPT

## 2020-12-01 PROCEDURE — 83605 ASSAY OF LACTIC ACID: CPT

## 2020-12-01 PROCEDURE — 6360000002 HC RX W HCPCS: Performed by: STUDENT IN AN ORGANIZED HEALTH CARE EDUCATION/TRAINING PROGRAM

## 2020-12-01 RX ORDER — DEXTROSE MONOHYDRATE 50 MG/ML
100 INJECTION, SOLUTION INTRAVENOUS PRN
Status: DISCONTINUED | OUTPATIENT
Start: 2020-12-01 | End: 2020-12-03 | Stop reason: HOSPADM

## 2020-12-01 RX ORDER — DEXTROSE MONOHYDRATE 25 G/50ML
12.5 INJECTION, SOLUTION INTRAVENOUS PRN
Status: DISCONTINUED | OUTPATIENT
Start: 2020-12-01 | End: 2020-12-03 | Stop reason: HOSPADM

## 2020-12-01 RX ORDER — GLUCAGON 1 MG/ML
1 KIT INJECTION PRN
Status: DISCONTINUED | OUTPATIENT
Start: 2020-12-01 | End: 2020-12-03 | Stop reason: HOSPADM

## 2020-12-01 RX ORDER — NICOTINE POLACRILEX 4 MG
15 LOZENGE BUCCAL PRN
Status: DISCONTINUED | OUTPATIENT
Start: 2020-12-01 | End: 2020-12-03 | Stop reason: HOSPADM

## 2020-12-01 RX ADMIN — IBUPROFEN 800 MG: 800 TABLET, FILM COATED ORAL at 23:05

## 2020-12-01 RX ADMIN — DEXTROSE MONOHYDRATE 2 G: 50 INJECTION, SOLUTION INTRAVENOUS at 02:36

## 2020-12-01 RX ADMIN — ENOXAPARIN SODIUM 40 MG: 40 INJECTION SUBCUTANEOUS at 09:07

## 2020-12-01 RX ADMIN — OXYCODONE HYDROCHLORIDE AND ACETAMINOPHEN 2 TABLET: 5; 325 TABLET ORAL at 19:06

## 2020-12-01 RX ADMIN — METRONIDAZOLE 500 MG: 500 TABLET, FILM COATED ORAL at 05:57

## 2020-12-01 RX ADMIN — SIMETHICONE 80 MG: 80 TABLET, CHEWABLE ORAL at 19:47

## 2020-12-01 RX ADMIN — DEXTROSE MONOHYDRATE 2 G: 50 INJECTION, SOLUTION INTRAVENOUS at 16:44

## 2020-12-01 RX ADMIN — CEPHALEXIN 500 MG: 500 CAPSULE ORAL at 23:05

## 2020-12-01 RX ADMIN — KETOROLAC TROMETHAMINE 30 MG: 30 INJECTION, SOLUTION INTRAMUSCULAR at 02:35

## 2020-12-01 RX ADMIN — DOCUSATE SODIUM 100 MG: 100 CAPSULE, LIQUID FILLED ORAL at 19:47

## 2020-12-01 RX ADMIN — CEPHALEXIN 500 MG: 500 CAPSULE ORAL at 05:57

## 2020-12-01 RX ADMIN — DOCUSATE SODIUM 100 MG: 100 CAPSULE, LIQUID FILLED ORAL at 09:07

## 2020-12-01 RX ADMIN — METRONIDAZOLE 500 MG: 500 TABLET, FILM COATED ORAL at 14:30

## 2020-12-01 RX ADMIN — Medication 1 TABLET: at 09:07

## 2020-12-01 RX ADMIN — OXYCODONE HYDROCHLORIDE AND ACETAMINOPHEN 2 TABLET: 5; 325 TABLET ORAL at 23:06

## 2020-12-01 RX ADMIN — CEPHALEXIN 500 MG: 500 CAPSULE ORAL at 14:30

## 2020-12-01 RX ADMIN — DEXTROSE MONOHYDRATE 2 G: 50 INJECTION, SOLUTION INTRAVENOUS at 09:13

## 2020-12-01 RX ADMIN — SIMETHICONE 80 MG: 80 TABLET, CHEWABLE ORAL at 09:24

## 2020-12-01 RX ADMIN — OXYCODONE HYDROCHLORIDE AND ACETAMINOPHEN 1 TABLET: 5; 325 TABLET ORAL at 14:57

## 2020-12-01 RX ADMIN — POLYETHYLENE GLYCOL 3350 17 G: 17 POWDER, FOR SOLUTION ORAL at 19:47

## 2020-12-01 RX ADMIN — IBUPROFEN 800 MG: 800 TABLET, FILM COATED ORAL at 14:58

## 2020-12-01 RX ADMIN — SODIUM CHLORIDE, POTASSIUM CHLORIDE, SODIUM LACTATE AND CALCIUM CHLORIDE: 600; 310; 30; 20 INJECTION, SOLUTION INTRAVENOUS at 09:24

## 2020-12-01 RX ADMIN — KETOROLAC TROMETHAMINE 30 MG: 30 INJECTION, SOLUTION INTRAMUSCULAR at 09:07

## 2020-12-01 RX ADMIN — METRONIDAZOLE 500 MG: 500 TABLET, FILM COATED ORAL at 23:05

## 2020-12-01 ASSESSMENT — PAIN SCALES - GENERAL
PAINLEVEL_OUTOF10: 4
PAINLEVEL_OUTOF10: 5
PAINLEVEL_OUTOF10: 7
PAINLEVEL_OUTOF10: 5
PAINLEVEL_OUTOF10: 7

## 2020-12-01 ASSESSMENT — PAIN DESCRIPTION - PROGRESSION
CLINICAL_PROGRESSION: GRADUALLY IMPROVING

## 2020-12-01 NOTE — DISCHARGE SUMMARY
Obstetric Discharge Summary  [facility]    Patient Name: Farideh Blanc  Patient : 1980  Primary Care Physician: No primary care provider on file. Admit Date: 2020    Principal Diagnosis: IUP at 39w5d, admitted for scheduled TOLAC due to advanced maternal age      Her pregnancy has been complicated by:   Patient Active Problem List   Diagnosis    History of  section    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 ([facility] [address]    History of DVT (deep vein thrombosis)    Grand multiparity    History of placental abruption (G3)    GBS bacteriuria    History of vacuum extraction assisted delivery    Desires  (vaginal birth after ) trial    Uterine fibroid during pregnancy, antepartum    History of postpartum depression ([facility])    Obesity in pregnancy, antepartum    Insulin controlled gestational diabetes mellitus (GDM) in third trimester    RUKHSANA (amniotic fluid index) borderline low    Fetal microcephaly    Breech presentation, no version    RLTCS 20 F APG 8/9 Wt 6#14    Placental abruption (G8)    Late deceleration of fetal heart rate       Infection Present?: Yes; GBS bacteruria  Hospital Acquired: No    Surgical Operations & Procedures:  [x] Pitocin Induction of Labor  [] Pitocin Augmentation of Labor  [] Prostaglandin Induction of Labor  [] Mechanical Induction of Labor  [] Artificial Rupture of Membranes  [] Intrauterine Pressure Catheter  [] Fetal Scalp Electrode  [] Amnioinfusion  Analgesia: epidural  Delivery Type:  Delivery: See Labor and Delivery Summary   Laceration(s): Absent    Consultations: NICU and Anesthesia    Pertinent Findings & Procedures:   Farideh Blanc is a 36 y.o. female [id number] at 39w5d admitted for a scheduled TOLAC due to advanced maternal age. Her  calculator was 89.1%. She has a Hx of CS x1 and has had 4 previous successful VBACs. She also has GDMA2.  During her induction she received Ancef for GBS prophylaxis, Pitocin, [facility]'ed and an epidural.     Patient noted to have recurrent deep variable decelerations and intermittent late decelerations, her cervix was checked and she was only 5 cm. Presenting part was not palpable, so ultrasound was used to confirm that baby was in breech presentation. Patient was also briskly bleeding so decision made to proceed with urgent  section due to Cat 2 tracing with newly found fetal malpresentation with concern for placental abruption or uterine rupture. She received azithromycin intra op. She delivered by repeat low transverse  a Live Born infant on 20. Information for the patient's :  Rodrigo Caro [[id number]]   female   Birth Weight: 6 lb 14.2 oz (3.125 kg)      Apgars: 8 at 1 minute and 9 at 5 minutes. Postpartum course: normal.    Due to urgent nature of case, proper skin cleansing was not done prior to the case. She received Ancef for 24 hours post op and Keflex/Flagyl for 48 hours. Lovenox 40 mg QD was given for DVT prophylaxis due to patient's hx of DVT. POD#0: Tachycardia and hypotension noted in recovery. Stat labs showed Hgb 9.1. POD#1: Hgb was 8.4    Course of patient: complicated by placental abruption     Discharge to: Home    Readmission planned: no     Recommendations on Discharge:     Medications:      Medication List      START taking these medications    docusate 100 MG Caps  Commonly known as:  COLACE, DULCOLAX  Take 100 mg by mouth 2 times daily     ferrous sulfate 325 (65 Fe) MG EC tablet  Commonly known as:  Fe Tabs  Take 1 tablet by mouth 2 times daily     ibuprofen 800 MG tablet  Commonly known as:  ADVIL;MOTRIN  Take 1 tablet by mouth every 8 hours as needed for Pain     oxyCODONE-acetaminophen 5-325 MG per tablet  Commonly known as:  PERCOCET  Take 1 tablet by mouth every 4 hours as needed for Pain for up to 5 days.         CHANGE how you take these medications    enoxaparin 40 MG/0.4ML injection  Commonly known as: LOVENOX  Inject 0.4 mLs into the skin every 24 hours  What changed:    · how much to take  · when to take this        CONTINUE taking these medications    FOLIC ACID PO     Magnesium 125 MG Caps     PRENATAL VITAMIN PO     vitamin B-12 500 MCG tablet  Commonly known as:  CYANOCOBALAMIN     vitamin B-6 100 MG tablet  Commonly known as:  PYRIDOXINE     VITAMIN D PO        STOP taking these medications    aspirin 81 MG EC tablet     blood glucose monitor kit and supplies     heparin (porcine) 83614 UNIT/ML injection     insulin  UNIT/ML injection vial  Commonly known as:  HUMULIN N;NOVOLIN N     insulin regular 100 UNIT/ML injection  Commonly known as:  HUMULIN R;NOVOLIN R     INSULIN SYRINGE 1CC/29G 29G X 1/2\" 1 ML Misc  Commonly known as:  [employer] INS SYRINGE 1CC/29G     IRON PO           Where to Get Your Medications      You can get these medications from any pharmacy    Bring a paper prescription for each of these medications  · docusate 100 MG Caps  · enoxaparin 40 MG/0.4ML injection  · ferrous sulfate 325 (65 Fe) MG EC tablet  · ibuprofen 800 MG tablet  · oxyCODONE-acetaminophen 5-325 MG per tablet         Activity: pelvic rest x 6 weeks, no driving on narcotics, no lifting greater than 15 lbs  Diet: regular diet  Follow up: 1 week for silver dressing removal, will need 2 hr GTT at 6 week PP appointment     Condition on discharge: stable    Discharge date: 12/3/20    Nat Arroyo DO  Ob/Gyn Resident    Comments:  Home care and follow-up care were reviewed. Pelvic rest, and birth control were reviewed. Signs and symptoms of mastitis and post partum depression were reviewed. The patient is to notify her physician if any of these occur. The patient was counseled on secondary smoke risks and the increased risk of sudden infant death syndrome and respiratory problems to her baby with exposure.  She was counseled on various alternate recommendations to decrease the exposure to secondary smoke to her children.

## 2020-12-01 NOTE — BRIEF OP NOTE
Department of Obstetrics and Gynecology  Obstetrical Brief Operative Report  [facility]    Patient: Shade Robertson   : 1980  MRN: [id number]       Acct: [de-identified]   Date of Procedure: 20    Pre-operative Diagnosis: 36 y.o. female [id number] at 38w11d IUP   Attempted TOLAC Due to Advanced Maternal Age    Hx CSx1 (G3)   Hx Successful  x4   GDMA2   Fetal Microcephaly   Hetero MTHFR   Hx DVT in     Hx Placental Abruption (G3)   Hx Post Partum Depression    Penicillin and Vancomycin allergies    BMI 33.47     Post-operative Diagnosis: placental abruption, unsuccessful , Living  infant, several omental/peritonal adhesions     Procedure: repeat low transverse  section    Surgeon: Dr Magi Paz): Torres Woodruff PGY3, Kareem Siegel PGY1    Anesthesia: epidural with Duramorph    Information for the patient's :  [place], Molinda Irons Girl Julian Less [[id number]]   female   Birth Weight: 6 lb 14.2 oz (3.125 kg)      Findings:  Live Born 6 lb 14 oz female infant in clarissa breech presentation with Apgars of 8 at 1 minute and 9 at five minutes, normal appearing uterus tubes and ovaries   Estimated Blood Loss: 900 mL  Total IV Fluids: 400mL  Urine output: 300mL clear urine   Drains:  neves catheter  Specimens:  placenta sent to pathology, cord blood and cord gases  Instrument and Sponge Count: Correct  Complications: none  Condition: Infant stable, transfer to [address], Mother stable, transfer to post anesthesia recovery    See dictated operative report for full details.        Cherry Arreola DO  Ob/Gyn Resident  2020, 7:38 PM

## 2020-12-01 NOTE — PROGRESS NOTES
POST OPERATIVE DAY # 1    Gamaliel Weston is a 36 y.o. female   This patient was seen and examined today. RLTCS on 20    Her pregnancy was complicated by:   Patient Active Problem List   Diagnosis    History of  section    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 ([facility] [address]    History of DVT (deep vein thrombosis)    High-risk pregnancy    Grand multiparity    Advanced maternal age in multigravida    History of placental abruption (G3)    GBS bacteriuria    Low-lying placenta: RESOLVED    History of vacuum extraction assisted delivery    Desires  (vaginal birth after ) trial    Uterine fibroid during pregnancy, antepartum    [facility] of [facility] depression ([facility]    Obesity in pregnancy, antepartum    Insulin controlled gestational diabetes mellitus (GDM) in third trimester    RUKHSANA (amniotic fluid index) borderline low    Fetal microcephaly    Breech presentation, no version    HRP (high risk pregnancy), unspecified trimester    RLTCS 20 F APG 8/9 Wt 6#14    Placental abruption (G8)       Today she is doing well without any chief complaint. Her lochia is light. She denies chest pain, shortness of breath, headache, lightheadedness, blurred vision and peripheral edema. She is breast feeding and she denies any signs or symptoms of mastitis. She has not tried ambulating since surgery. Her neves cath is in place and draining clear urine. Flatus absent. Bowel movement absent. She is tolerating solids. When her labs were drawn at 11pm yesterday, she reports she just ate a snickers bar. RN at beside to redraw blood glucose.      Vital Signs:  Vitals:    20   BP: 114/67 124/73 (!) 109/58 121/61   Pulse: 91 99 110 86   Resp: 18 16 16 18   Temp:       TempSrc:       SpO2:       Weight:       Height:             Urine Input & Output last 24hrs:     Intake/Output Summary (Last 24 hours) at 2020 0215  Last data filed at 2020 2133  Gross per 24 hour   Intake 801.1 ml   Output 1330 ml   Net -528.9 ml   urine output 125 mL/hr. Clear urine       Physical Exam:  General:  no apparent distress, alert and cooperative  Neurologic:  alert, oriented, normal speech, no focal findings or movement disorder noted  Lungs:  No increased work of breathing, good air exchange, clear to auscultation bilaterally, no crackles or wheezing  Heart:  Regular rate and rhythm, normal S1 and S2, no S3 or S4, and no murmur noted    Abdomen: abdomen soft, non-distended, non-tender, bowel sounds present   Fundus: non-tender, firm, below umbilicus  Incision: silver dressing in place with scattered small areas of serosanguinous drainage   Extremities:  no calf tenderness, non edematous BL, SCDs In place     Labs:  Lab Results   Component Value Date    WBC 15.2 (H) 11/30/2020    HGB 9.1 (L) 11/30/2020    HCT 28.3 (L) 11/30/2020    MCV 80.4 (L) 11/30/2020     11/30/2020       Assessment/Plan:  1. Lacy Liriano is a [id number] POD # 1 s/p RLTCS   - Doing well, VSS    - Female infant in [address]    - Encourage ambulation and use of incentive spirometer   - D/C neves catheter and saline lock IV on POD #1    - Silver dressing will need to be removed one week from surgery    - COVID19 neg 11/30  2. Rh positive/Rubella immune  3. Breast feeding   - Denies any s/s of mastitis   4. GDMA2  - Home insulin regmien held (Novolog 0/0/10 w/ NPH 16U nightly)  - Glucose @ 2322 was 241. Patient recently ate a [employer] candy bar. POCT glucose now was 134  - Will need 2 hr GTT at 6 week PP appointment   5. Anemia due to Acute Blood Loss from Placental Abruption   - Starting Hgb was 11.4 and decreased to 9.1 while in recovery room. Coags were normal at that time with fibrinogen of 468.  LA was elevated 3.6  - EBL during case was approximately 900 mL due to placental abruption   - Repeat CBC and LA ordered for the morning   - She is hemodynamically stable and has no s/s of anemia   - Will need iron supplementation on discharge  - Due to urgent nature of case, patient did not receive proper skin cleansing under sterile technique. Will give Ancef 2g for 24 hours and then give Keflex and Flagyl for 48 hrs for wound infection prophylaxis   - KUB 11/30: No evidence of bowel obstruction. No retained surgical instruments, L common iliac and external iliac vein stents, neves cath in place   6. Hx LLE DVT with Common Iliac and External Iliac Vent Stents w/ Hetero MTHFR    - Will give Lovenox 40 mg QD and will continue for 6 weeks PP   - Outpatient referral placed to vascular for follow up for these iliac stents. She has not seen a provider since she stopped taking Coumadin in 2002   7. Hx Post Partum Depression   - Denies any SI, HI or s/s of depression   - SW Consulted   8. Continue post-op care. Counseling Completed:  Secondary Smoke risks and Sudden Infant Death Syndrome were reviewed with recommendations. Infant sleeping, \"back to sleep\" and avoidance of co-sleeping recommendations were reviewed. Signs and Symptoms of Post Partum Depression were reviewed. The patient is to call if any occur. Signs and symptoms of Mastitis were reviewed. The patient is to call if any occur for follow up.   Discharge instructions including pelvic rest, incision care, 15 lb weight restriction, no driving with pain medicine and office follow-up were reviewed with patient     Attending Physician: Dr. Marcelo Lim DO  Ob/Gyn Resident  12/1/2020, 2:15 AM

## 2020-12-01 NOTE — CONSULTS
Mom has nursed 9 other children, reports this baby is doing very well at breast.  States she has a history of mastitis, reviewed signs/symptoms and when to seek medical assistance. Education folder given, instructed to please call with concerns.

## 2020-12-01 NOTE — CARE COORDINATION
POST-PARTUM/WIN INITIAL DISCHARGE PLANNING/CARE COORDINATION    HRP (high risk pregnancy), unspecified trimester [O09.90]    HPI: Writer met w/ patient and Shahnaz at bedside to discuss DCP. Anticipate DC of couplet 12/4/2020 after CS of Female on 11/30 @ 1845 at 39w5d. Infant name on BC: [place]. Infant to WIN. Infant PCP Dr. Teodroo French @ [facility]. FOB: Ezekiel Ayala phone [phone] verified name/address/phone number/[employer] insurance correct on facesheet    Writer notified patient has 30 days from date of birth to add infant to insurance policy. Anita Galeano verbalized understanding and will call [employer]/he can do online. They both verbalized have all necessary items for infant. No previous home care. Has glucometer and supplies. No anticipated need for home nursing or dme. CM continue to follow for any DC needs.

## 2020-12-01 NOTE — L&D DELIVERY NOTE
Mother's Information    Labor Events     labor?:  No     Mother Delivery Information    Episiotomy:  None  Lacerations:  None  Repair Suture:  None  Vaginal Delivery Est. Blood Loss (mL):  900  Surgical or Additional Est. Blood Loss (mL):  0 (View Only):  Edit in Flowsheets   Combined Est. Blood Loss (mL):  900        Inessa Rose, Baby Mar Santos  [[id number]]    Labor Events     labor?:  No   steroids?:  None  Cervical ripening date/time:     Antibiotics received during labor?:  Yes  Rupture Identifier:  Sac 1   Rupture date/time: 20 16:15:00   Rupture type:  Spontaneous=SROM  Fluid color:  Clear  Fluid odor:  None  Induction:  Oxytocin  Indications for induction:  Medical  Labor complications:  Abruptio Placenta, Meconium at birth   Additional complications:   OB: DELIVERY - COMPLICATIONS   Breech presentation of fetus         Labor Event Times    Labor onset date/time: 20 1358   Dilation complete date/time:       Start pushing:    Decision time (emergent ): 2020 1832      Anesthesia    Method:  Epidural     Assisted Delivery Details    Forceps attempted?:  No  Vacuum extractor attempted?:  No     Document Additional Attempt       Document Additional Attempt             Shoulder Dystocia    Shoulder dystocia present?:  No  Add Second Maneuver  Add Third Maneuver  Add Fourth Maneuver  Add Fifth Maneuver  Add Sixth Maneuver  Add Seventh Maneuver  Add Eighth Maneuver  Add Ninth Maneuver     Marble Presentation    Presentation:  Breech     Marble Information    Head delivery date/time:  2020 18:45:00   Changing the 's delivery date/time could affect patient care.:     Delivery date/time:   20 1845   Delivery type:  , Low Transverse    Details:   Trial of labor?:  Yes    categorization:  Repeat    priority:  Emergent   Indications for :  Fetal Intolerance of Labor, Breech   Skin Incision Type:  Pfannenstiel Uterine Incision:  Low Transverse         Delivery Providers    Delivering clinician:  Radha Groves DO   Provider Role    Pam Garner, [address], DO     Nuvia Whiting, MANSI Porter, MANSI       Cord    Vessels:  3 Vessels  Complications:  None  Delayed cord clamping?:  Yes  Cord clamped date/time:  2020 184  Cord blood disposition:  Lab  Gases sent?:  Yes  Stem cell collection (by provider):   No     Placenta    Date/time:  2020 18:47:00  Removal:  Manual Removal  Appearance:  Intact  Disposition:  Pathology     Delivery Resuscitation    Method:  Bulb Suction, Stimulation     Apgars    Living status:  Living  Apgars   1 Minute:   5 Minute:   10 Minute 15 Minute 20 Minute   Skin Color: 0  1       Heart Rate: 2  2       Reflex Irritability: 2  2       Muscle Tone: 2  2       Respiratory Effort: 2  2       Total: 8  9                  Skin to Skin    Skin to skin initiation date/time:     Skin to skin end date/time:     Breastfeeding initiated date/time:  2020 19:50:00     Twisp Measurements    Weight:  3125 g Length:  49.5 cm   Head circumference:  33.5 cm       Delivery Information    Episiotomy:  None  Perineal lacerations:  None    Vaginal laceration:  No    Cervical laceration:  No    Vaginal delivery est. blood loss (mL):  900  Surgical or additional est. blood loss (mL):  0 (View Only):  Edit in Flowsheets   Combined est. blood loss (mL):  900  Repair suture:  None     Other Procedures    Procedures:  None     Labor Length    3rd stage:  0h 02m

## 2020-12-01 NOTE — ANESTHESIA POSTPROCEDURE EVALUATION
Department of Anesthesiology  Postprocedure Note    Patient: An Adrian  MRN: [id number]  [place]: 1980  Date of evaluation: 2020  Time:  7:45 PM     Procedure Summary     Date:  20 Room / Location:  [facility] OR [address] [place] [address]    Anesthesia Start:  1433 Anesthesia Stop:      Procedures:        SECTION (N/A )      Labor Analgesia Diagnosis:  (breech laboring)    Surgeon:  Adrian Reddy DO Responsible Provider:  Cece Jarrell MD    Anesthesia Type:  epidural ASA Status:  2          Anesthesia Type: epidural    Loren Phase I: Loren Score: 9    Loren Phase II:      Last vitals: Reviewed and per EMR flowsheets.        Anesthesia Post Evaluation    Patient location during evaluation: PACU  Patient participation: complete - patient participated  Level of consciousness: awake  Pain score: 1  Airway patency: patent  Nausea & Vomiting: no nausea and no vomiting  Complications: no  Cardiovascular status: blood pressure returned to baseline and hemodynamically stable  Respiratory status: acceptable  Hydration status: euvolemic

## 2020-12-01 NOTE — CARE COORDINATION
Social Work     Sw reviewed medical record (current active problem list) and tox screens and found no concerns. Sw did receive consult for:  Emergent , hx of PPD. Sw met with mom and dad (holding baby)  briefly to explain Sw role, inquire if any needs or concerns, and provide safe sleep education and discuss. Mom denied any needs or questions and informs baby has a safe sleep environment. Mom reports this is her 8th child ( 12 yrs down to 16 months), parents report their other children are very excited. Mom denied any current s/s of anxiety or depression, but states she is aware of who to reach out to and what to do if s/s occur. Parents report a good support system and report Ped. [facility]. Parents denied any current needs or concerns. Sw encouraged mom to reach out if any issues or concerns arise.

## 2020-12-01 NOTE — PROGRESS NOTES
[place] Vincenzo was c/o increased pressure. Strip with deeper variables with each contraction. SVE and vertex was non engaged. Copious amount of meconium noted coming from vagina. BSUS with breech presentation. Dr. Xiomy Sandhu was at desk and came into room to confirm. C section called. See op notes. Date: 2020  Time: 12:51 PM      Patient Name: Yanick Willson  Patient : 1980  Room/Bed: [address] [place] [address]  Admission Date/Time: 2020  6:21 AM        Attending Physician Statement    Agree with and appreciate above note. Presented to bedside due to Category II tracing, diagnosed now with breech presentation, and copious meconium/ terminal meconium coming out the vagina. Stat c/s called. Anesthesia and NICU paged 911. Patient consensted Patient counseled for c/s due to breech presentation. Risks, benefits, and alternatives discussed including but not limited to hemorrhage, infection, injury to nearby organs, need for additional operative procedures, and injury to the baby, exceedingly rare risk of stroke and death. Please see operative note for full details. Stat c/s performed, 50% abruption noted, breech presentation.        Attending's Name:  Bari Pinzon DO

## 2020-12-01 NOTE — OP NOTE
Operative Note  Department of Obstetrics and Gynecology  [facility]     Patient: Saman Torres   : 1980  MRN: [id number]       Acct: [de-identified]   PCP: No primary care provider on file. Date of Procedure: 20    Pre-operative Diagnosis: 36 y.o. female [id number] at 39w5d IUP     Attempted TOLAC Due to Advanced Maternal Age               Hx CSx1 (G3)              Hx Successful  x4              GDMA2              Fetal Microcephaly              Hetero MTHFR              Hx DVT in                Hx Placental Abruption (G3)              Hx Post Partum Depression               Penicillin and Vancomycin allergies               BMI 33.47     Post-operative Diagnosis: placental abruption, unsuccessful , Living  infant, several omental/peritonal adhesions      Procedure: repeat low transverse  section    Indications: Patient is a 36year old [id number] at 38w11d who presented for a scheduled TOLAC due to [place]. She had a hx of  x1 with 4 previous successful VBACs. The patient was confirmed to be cephalic on admission. She received pitocin during her induction and spontaneous ruptured. Recurrent variable and late decelerations were noted on the monitor that were progressively getting more deep. Patient was checked and found to be 8 cm. Decelerations continued and repeat cervical exam was performed by midwife and patient was found to be only 5 cm and no fetal presenting part was able to be palpated. Bedside ultrasound confirmed that patient was in breech presentation. There was copious amounts of meconium mixed with brisk bleeding noted. Due to Category 2 tracing remote from delivery with baby in breech presentation, decision was made to proceed with emergent  due to concern for placental abruption.      Surgeon: Dr Letitia Queen   Assistants: Adelso Schroeder PGY3, Vicky Clancy PGY1    Anesthesia: epidural with duramorph    Procedure Details   The patient was seen pre-operatively. The risks, benefits, complications, treatment options, and expected outcomes were discussed with the patient. The patient concurred with the proposed plan, giving informed consent. The patient was taken to the Operating Room, identified as Emilie Gonzalez and the procedure verified as  Delivery. A Time Out was held and the above information confirmed. Patient already had an epidural and neves cath in place. Her epidural was re dosed and Duramorph was given by anesthesia. Fetal heart tones were obtained in the OR and noted to be in the 80s. Iodine drape was used and a stat  section was performed. Patient was not prepped in sterile fashion. A Pfannenstiel incision was made and carried down through the subcutaneous tissue to the fascia using scalpel. Fascial incision was made and extended transversely using blunt dissection for sharp dissection. The fascia was  from the underlying rectus tissue superiorly and inferiorly using blunt dissection. It was noted that the connective tissue was very edematous and stretchy appearing. All layers were very easy to dissect. The peritoneum was identified and entered bluntly. Blunt dissection was used to take the peritoneum down sharply. Peritoneal incision was extended longitudinally with blunt stretch, bladder retractor was placed. There were several omental and peritoneal adhesions noted to the anterior/fundal surfaces of the uterus. Peritoneum was not able to be fully taken down, but enough access was made to create a hysterotomy. No uterine rupture was noted. A low transverse uterine incision was made using a new scalpel blade. Immediately after hysterotomy was made, several large blood clots were expelled. Blunt stretch on the hysterotomy incision was made revealing thick meconium stained fluid with several large blood clots. Fetus noted to be clarissa breech presentation.  Fetal ASIS were palpated deep in the pelvis and fetal buttocks was guided through the hysterotomy. Fetal legs were swept anteriorly and delivered through the hysterotomy. Fetal abdomen was delivered, umbilical cord was lengthened. Fetal arms were then swept anteriorly and delivered through the hysterotomy. Fetal head was flexed and delivered easily. Delivered from breech  presentation was a Live Born female infant. The infant was suctioned, dried and the umbilical cord was clamped and cut after one minute delayed cord clamping. The infant was taken to the warmer and attended by NICU for evaluation. A second section of cord was clamped and cut and sent for gases. Cord blood was obtained for evaluation. The placenta was removed spontaneously with gentle traction and appeared whole and that the umbilical cord had three vessels noted. >50% placental abruption was noted. Pitocin was started. The uterine outline appeared normal. The uterus was exteriorized and cleaned of all clots and debris. There was a left extension through the cervix noted. This extension was repaired with running locked sutures of 0-Vicryl. Extension was then incorporated into the hysterotomy and the remaining incision was closed with running locked sutures of 0 Vicryl. The uterus was reintroduced into the abdominal cavity. Bilateral abdominal gutters were cleared as best as possible of all clots and debris but inspection was limited due to several omental adhesions. Bilateral tubes and ovaries were visualized and appeared normal. The hysterotomy was again inspected and found to be hemostatic. Abdomen was copiously irrigated. Rectus muscles were inspected and found to be hemostatic. The fascia was then reapproximated with running sutures of 0 Vicryl. The skin was reapproximated with insorb staples. The skin was then cleansed and dressed with a silver bandage in sterile fashion. The urine remained clear throughout the case. Ancef was already being administered for GBS prophylaxis.  It was re dosed during the case and Azithromycin was given. SCDs for DVT prophylaxis remain in place for the post operative period. Due to urgent nature of case, instrument, needle and sponge count were not obtained prior to incision being made. Abdominal Xray was ordered and showed no retained surgical instruments. Vaginal prep was done after the case was completed due to the copious amounts of meconium that was inside the vagina. Dr. Gaby Solorio was present for the entire operation. Findings:  Live Born 6 lb 14 oz female infant in clarissa breech presentation with Apgars of 8 at 1 minute and 9 at five minutes, normal appearing uterus tubes and ovaries, couvelaire uterus, abnormally edematous and stretchy connective tissue, several peritoneal and omental adhesions    Estimated Blood Loss: 900 mL  Total IV Fluids: 400mL  Urine output: 300mL clear urine   Drains:  neves catheter  Specimens:  placenta sent to pathology, cord blood and cord gases  Instrument and Sponge Count: Correct  Complications: none  Condition: Infant stable, transfer to [facility] Cormier, Mother stable, transfer to post anesthesia recovery      Karolina Hill DO  OB/GYN Resident  12/1/2020, 3:39 AM    This dictation was performed by a resident physician and then was thoroughly reviewed by the attending prior to being signed.

## 2020-12-02 PROBLEM — O09.90 HIGH-RISK PREGNANCY: Status: RESOLVED | Noted: 2020-05-06 | Resolved: 2020-12-02

## 2020-12-02 PROBLEM — O09.529 ADVANCED MATERNAL AGE IN MULTIGRAVIDA: Status: RESOLVED | Noted: 2020-05-06 | Resolved: 2020-12-02

## 2020-12-02 PROBLEM — O44.40 LOW-LYING PLACENTA: Chronic | Status: RESOLVED | Noted: 2020-07-21 | Resolved: 2020-12-02

## 2020-12-02 PROBLEM — O09.90 HRP (HIGH RISK PREGNANCY), UNSPECIFIED TRIMESTER: Status: RESOLVED | Noted: 2020-11-30 | Resolved: 2020-12-02

## 2020-12-02 PROCEDURE — 6360000002 HC RX W HCPCS: Performed by: STUDENT IN AN ORGANIZED HEALTH CARE EDUCATION/TRAINING PROGRAM

## 2020-12-02 PROCEDURE — 1220000000 HC SEMI PRIVATE OB R&B

## 2020-12-02 PROCEDURE — 6370000000 HC RX 637 (ALT 250 FOR IP): Performed by: STUDENT IN AN ORGANIZED HEALTH CARE EDUCATION/TRAINING PROGRAM

## 2020-12-02 RX ORDER — IBUPROFEN 800 MG/1
800 TABLET ORAL EVERY 8 HOURS PRN
Qty: 30 TABLET | Refills: 0 | Status: SHIPPED | OUTPATIENT
Start: 2020-12-02 | End: 2021-04-06 | Stop reason: ALTCHOICE

## 2020-12-02 RX ORDER — OXYCODONE HYDROCHLORIDE AND ACETAMINOPHEN 5; 325 MG/1; MG/1
1 TABLET ORAL EVERY 4 HOURS PRN
Qty: 20 TABLET | Refills: 0 | Status: SHIPPED | OUTPATIENT
Start: 2020-12-02 | End: 2020-12-07

## 2020-12-02 RX ORDER — LANOLIN ALCOHOL/MO/W.PET/CERES
325 CREAM (GRAM) TOPICAL 2 TIMES DAILY
Qty: 90 TABLET | Refills: 3 | Status: SHIPPED | OUTPATIENT
Start: 2020-12-02 | End: 2021-04-06 | Stop reason: ALTCHOICE

## 2020-12-02 RX ORDER — PSEUDOEPHEDRINE HCL 30 MG
100 TABLET ORAL 2 TIMES DAILY
Qty: 60 CAPSULE | Refills: 0 | Status: SHIPPED | OUTPATIENT
Start: 2020-12-02 | End: 2021-04-06 | Stop reason: ALTCHOICE

## 2020-12-02 RX ADMIN — IBUPROFEN 800 MG: 800 TABLET, FILM COATED ORAL at 23:09

## 2020-12-02 RX ADMIN — IBUPROFEN 800 MG: 800 TABLET, FILM COATED ORAL at 07:08

## 2020-12-02 RX ADMIN — SIMETHICONE 80 MG: 80 TABLET, CHEWABLE ORAL at 11:09

## 2020-12-02 RX ADMIN — IBUPROFEN 800 MG: 800 TABLET, FILM COATED ORAL at 15:08

## 2020-12-02 RX ADMIN — DOCUSATE SODIUM 100 MG: 100 CAPSULE, LIQUID FILLED ORAL at 21:58

## 2020-12-02 RX ADMIN — OXYCODONE HYDROCHLORIDE AND ACETAMINOPHEN 2 TABLET: 5; 325 TABLET ORAL at 03:13

## 2020-12-02 RX ADMIN — OXYCODONE HYDROCHLORIDE AND ACETAMINOPHEN 2 TABLET: 5; 325 TABLET ORAL at 11:08

## 2020-12-02 RX ADMIN — ENOXAPARIN SODIUM 40 MG: 40 INJECTION SUBCUTANEOUS at 08:54

## 2020-12-02 RX ADMIN — CEPHALEXIN 500 MG: 500 CAPSULE ORAL at 07:07

## 2020-12-02 RX ADMIN — OXYCODONE HYDROCHLORIDE AND ACETAMINOPHEN 2 TABLET: 5; 325 TABLET ORAL at 15:08

## 2020-12-02 RX ADMIN — METRONIDAZOLE 500 MG: 500 TABLET, FILM COATED ORAL at 22:38

## 2020-12-02 RX ADMIN — Medication 1 TABLET: at 08:54

## 2020-12-02 RX ADMIN — METRONIDAZOLE 500 MG: 500 TABLET, FILM COATED ORAL at 07:07

## 2020-12-02 RX ADMIN — OXYCODONE HYDROCHLORIDE AND ACETAMINOPHEN 2 TABLET: 5; 325 TABLET ORAL at 07:08

## 2020-12-02 RX ADMIN — DOCUSATE SODIUM 100 MG: 100 CAPSULE, LIQUID FILLED ORAL at 08:54

## 2020-12-02 RX ADMIN — OXYCODONE HYDROCHLORIDE AND ACETAMINOPHEN 2 TABLET: 5; 325 TABLET ORAL at 18:57

## 2020-12-02 RX ADMIN — CEPHALEXIN 500 MG: 500 CAPSULE ORAL at 14:43

## 2020-12-02 RX ADMIN — CEPHALEXIN 500 MG: 500 CAPSULE ORAL at 22:38

## 2020-12-02 RX ADMIN — METRONIDAZOLE 500 MG: 500 TABLET, FILM COATED ORAL at 14:43

## 2020-12-02 ASSESSMENT — PAIN SCALES - GENERAL
PAINLEVEL_OUTOF10: 7
PAINLEVEL_OUTOF10: 5
PAINLEVEL_OUTOF10: 7

## 2020-12-02 ASSESSMENT — PAIN DESCRIPTION - PROGRESSION: CLINICAL_PROGRESSION: GRADUALLY IMPROVING

## 2020-12-02 NOTE — PROGRESS NOTES
[facility] Courtesy Post-Op Note  PP Day 2      SUBJECTIVE:   Doing well and voices no concerns. Voiding, ambulating, eating without difficulty. Denies any leg pain, HA or visual changes. Bonding with baby. Resting well. Reports pain/cramping and is 5/10 on the pain scale. She reports it is controlled with oral meds reports still taking two percocet     OBJECTIVE:     Vitals:  /65   Pulse 90   Temp 98.2 °F (36.8 °C) (Oral)   Resp 16   Ht 5' 2\" (1.575 m)   Wt 183 lb (83 kg)   LMP 02/26/2020   SpO2 99%   Breastfeeding Unknown   BMI 33.47 kg/m²     Constitutional:  Awake, alert, cooperative, no apparent distress. Appears to be bonding well with baby, does not appear overly stressed with infant handling    Pain:  controlled    Breasts:  Soft without tenderness, nipples are intact; breastfeeding    Abdominal Exam: Soft, non-distended, non-tender, lax muscle tone                                 + flatus, - BM    Uterus:   Fundus is mid-line, firm @ U/-U. Non-tender with palpation. Incision: Dressing in place    Bladder: Voiding QS                       Lochia: Small and Rubra    Extremities: Negative Estefania sign. Minimal edema     Labs:   Lab Results   Component Value Date    HGB 8.4 12/01/2020    HCT 26.9 12/01/2020       ASSESSMENT/PLAN:   Post-Op Day 2  - Continue post-Op PP care  - Reviewed birth experience and is tearful but accepting  Discharge instructions were reviewed regarding perineal care, breast care, activity, rest, diet, hygiene and PP depression. Questions were answered.   - Discharge planned for tomorrow   - RTO in 1 week  Please see resident/physician note for medical management of care  Breastfeeding without difficulty  - Breast care reviewed  Rh positive /Rubella immune  Pregestational Diabetes  - To follow up postpartum

## 2020-12-02 NOTE — PROGRESS NOTES
POST OPERATIVE DAY # 2    An Adrian is a 36 y.o. female   This patient was seen and examined today. S/P RLTCS on 20. Her pregnancy was complicated by:   Patient Active Problem List   Diagnosis    History of  section    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 ([facility] [address]    History of DVT (deep vein thrombosis)    High-risk pregnancy    Grand multiparity    Advanced maternal age in multigravida    History of placental abruption (G3)    GBS bacteriuria    Low-lying placenta: RESOLVED    History of vacuum extraction assisted delivery    Desires  (vaginal birth after ) trial    Uterine fibroid during pregnancy, antepartum    History of postpartum depression ([facility])    Obesity in pregnancy, antepartum    Insulin controlled gestational diabetes mellitus (GDM) in third trimester    RUKHSANA (amniotic fluid index) borderline low    Fetal microcephaly    Breech presentation, no version    HRP (high risk pregnancy), unspecified trimester    RLTCS 20 F APG 8/9 Wt 6#14    Placental abruption (G8)    Late deceleration of fetal heart rate       Today she is doing well without any chief complaint. Her lochia is light. She denies chest pain, shortness of breath, headache, lightheadedness, blurred vision and peripheral edema. She is  breast feeding and she denies any signs or symptoms of mastitis. She is ambulating well. She is voiding without difficulty. She currently denies S/S of postpartum depression. Flatus absent. Bowel movement absent. She is tolerating solids. Patient reports she is unsure whether or not she would like to go home later today.      Vital Signs:  Vitals:    20 0815 20 1200 20 1600 20   BP: 112/65 (!) 89/53 99/61 106/62   Pulse: 86 81 89 74   Resp: 18 18 18 18   Temp: 98.8 °F (37.1 °C) 99.1 °F (37.3 °C) 99 °F (37.2 °C) 98 °F (36.7 °C)   TempSrc: Oral Oral Oral Oral   SpO2: 100%  98%    Weight:       Height:           Urine Input & Output last 24hrs:     Intake/Output Summary (Last 24 hours) at 12/2/2020 0048  Last data filed at 12/1/2020 1720  Gross per 24 hour   Intake 1129 ml   Output 3300 ml   Net -2171 ml       Physical Exam:  General:  no apparent distress, alert and cooperative  Neurologic:  alert, oriented, normal speech, no focal findings or movement disorder noted  Lungs:  No increased work of breathing, good air exchange, clear to auscultation bilaterally, no crackles or wheezing  Heart:  regular rate and rhythm    Abdomen: abdomen soft, non-distended, non-tender  Fundus: non-tender, normal size, firm, below umbilicus  Incision: Silver dressing in place with small areas of serosanguinous drainage; C/D/I  Extremities:  no calf tenderness, non edematous    Labs:  Lab Results   Component Value Date    WBC 10.0 12/01/2020    HGB 8.4 (L) 12/01/2020    HCT 26.9 (L) 12/01/2020    MCV 83.3 12/01/2020     12/01/2020       Assessment/Plan:  1. Lesli Zaldivar is a L1S5599 POD # 2 s/p RLTCS on 11/30/20   - Doing well, VSS    - female infant in [address]    - Encourage ambulation and use of incentive spirometer   - CBC completed, Hgb stable at 8.4   - Pain control: Percocet/Motrin   - Antibiotic prophylaxis: S/P Ancef and Azithromycin pre-operatively; Ancef x 24 hours followed by Keflex/Flagyl x 48 hours post-operatively    - Diet: general   - Silver dressing in place, to be removed 12/7/20   - DVT prophylaxis: Lovenox 40 mg qd and SCDs   - COVID-19 negative 11/30/20   2. Rh positive/Rubella immune  3. Breast feeding    - Denies s/s mastitis   4. Acute blood loss anemia secondary to Placental Abruption    -  mL secondary to placental abruption    - Hgb 11.4 on admission > 9.1 on POD#0 > 8.4 on POD#1   - Will need oral iron supplementation on discharge   - Clinically asymptomatic, hemodynamically stable   5.  GDMA2   - Pregnancy insulin regimen (Novolog 0/0/10 w/ meals and NPH 16U qhs) held postpartum    - Patient will need 2 hr GTT six weeks postpartum   6. Hx LLE DVT w/ Common Iliac and External Iliac Vein Stents    - KUB 11/30: No evidence of bowel obstruction. No retained surgical instruments, L common iliac and external iliac vein stents   - Patient with a history of Heterozygous MTHFR mutation   - Continue Lovenox 40 mg qd and continue for 6 weeks postpartum   - Outpatient referral placed to vascular for follow up regarding iliac stents as she has not seen a provider since she stopped taking coumadin in 2002  7. Hx Postpartum Depression   - Denies s/s postpartum depression at this time   - Denies SI/HI   - SW consulted, no concerns   8. Continue post-op care. Counseling Completed:  Secondary Smoke risks and Sudden Infant Death Syndrome were reviewed with recommendations. Infant sleeping, \"back to sleep\" and avoidance of co-sleeping recommendations were reviewed. Signs and Symptoms of Post Partum Depression were reviewed. The patient is to call if any occur. Signs and symptoms of Mastitis were reviewed. The patient is to call if any occur for follow up.   Discharge instructions including pelvic rest, incision care, 15 lb weight restriction, no driving with pain medicine and office follow-up were reviewed with patient     Attending Physician: Dr. Regina Jordan DO  Ob/Gyn Resident  12/2/2020, 12:48 AM No

## 2020-12-02 NOTE — LACTATION NOTE
Mom denies any needs with breast feeding, just purlan. Discussed deeper latch. Supporting baby and breast for  feeding.

## 2020-12-03 VITALS
BODY MASS INDEX: 33.68 KG/M2 | TEMPERATURE: 98.4 F | WEIGHT: 183 LBS | RESPIRATION RATE: 16 BRPM | HEART RATE: 91 BPM | SYSTOLIC BLOOD PRESSURE: 130 MMHG | HEIGHT: 62 IN | DIASTOLIC BLOOD PRESSURE: 88 MMHG | OXYGEN SATURATION: 100 %

## 2020-12-03 PROBLEM — Z95.828 STATUS POST INSERTION OF ILIAC ARTERY STENT: Status: ACTIVE | Noted: 2020-12-03

## 2020-12-03 LAB — SURGICAL PATHOLOGY REPORT: NORMAL

## 2020-12-03 PROCEDURE — 6370000000 HC RX 637 (ALT 250 FOR IP): Performed by: STUDENT IN AN ORGANIZED HEALTH CARE EDUCATION/TRAINING PROGRAM

## 2020-12-03 PROCEDURE — 6360000002 HC RX W HCPCS: Performed by: STUDENT IN AN ORGANIZED HEALTH CARE EDUCATION/TRAINING PROGRAM

## 2020-12-03 RX ADMIN — IBUPROFEN 800 MG: 800 TABLET, FILM COATED ORAL at 07:55

## 2020-12-03 RX ADMIN — ENOXAPARIN SODIUM 40 MG: 40 INJECTION SUBCUTANEOUS at 08:28

## 2020-12-03 RX ADMIN — DOCUSATE SODIUM 100 MG: 100 CAPSULE, LIQUID FILLED ORAL at 08:28

## 2020-12-03 RX ADMIN — OXYCODONE HYDROCHLORIDE AND ACETAMINOPHEN 1 TABLET: 5; 325 TABLET ORAL at 04:23

## 2020-12-03 RX ADMIN — OXYCODONE HYDROCHLORIDE AND ACETAMINOPHEN 1 TABLET: 5; 325 TABLET ORAL at 07:55

## 2020-12-03 RX ADMIN — OXYCODONE HYDROCHLORIDE AND ACETAMINOPHEN 1 TABLET: 5; 325 TABLET ORAL at 00:13

## 2020-12-03 RX ADMIN — OXYCODONE HYDROCHLORIDE AND ACETAMINOPHEN 1 TABLET: 5; 325 TABLET ORAL at 12:09

## 2020-12-03 RX ADMIN — Medication 1 TABLET: at 08:28

## 2020-12-03 ASSESSMENT — PAIN SCALES - GENERAL
PAINLEVEL_OUTOF10: 5
PAINLEVEL_OUTOF10: 4
PAINLEVEL_OUTOF10: 5
PAINLEVEL_OUTOF10: 4
PAINLEVEL_OUTOF10: 2

## 2020-12-03 ASSESSMENT — PAIN DESCRIPTION - PROGRESSION: CLINICAL_PROGRESSION: GRADUALLY IMPROVING

## 2020-12-03 ASSESSMENT — PAIN DESCRIPTION - FREQUENCY: FREQUENCY: CONTINUOUS

## 2020-12-03 ASSESSMENT — PAIN DESCRIPTION - ORIENTATION: ORIENTATION: ANTERIOR;LOWER

## 2020-12-03 ASSESSMENT — PAIN DESCRIPTION - DESCRIPTORS: DESCRIPTORS: SORE

## 2020-12-03 ASSESSMENT — PAIN DESCRIPTION - ONSET: ONSET: ON-GOING

## 2020-12-03 ASSESSMENT — PAIN DESCRIPTION - PAIN TYPE: TYPE: SURGICAL PAIN

## 2020-12-03 ASSESSMENT — PAIN DESCRIPTION - LOCATION: LOCATION: INCISION

## 2020-12-03 NOTE — PROGRESS NOTES
CLINICAL PHARMACY NOTE: MEDS TO [address] Select Patient?: No  Total # of Prescriptions Filled: 3   The following medications were delivered to the patient:  · lovenox 40mg/0.4ml  · Ibuprofen 800mg tablet  · Percocet 5/325mg tablet  Total # of Interventions Completed: 0  Time Spent (min): 0    Additional Documentation: meds delivered to the pt room on 12. 3.20

## 2020-12-03 NOTE — PROGRESS NOTES
[facility] Note  Hospital Day: 4  Post Op Day: 3      SUBJECTIVE:   Doing well and voices no concerns. Voiding, ambulating, eating without difficulty. Denies headache, vision changes, RUQ pain, epigastric pain, and swelling. Bonding with baby. Resting well. Reports pain/cramping and is 5/10 on the pain scale. She reports it is controlled with oral meds. She desires discharge home later today. OBJECTIVE:     Vitals:  /88   Pulse 91   Temp 98.4 °F (36.9 °C) (Oral)   Resp 16   Ht 5' 2\" (1.575 m)   Wt 183 lb (83 kg)   LMP 02/26/2020   SpO2 100%   Breastfeeding Unknown   BMI 33.47 kg/m²     Constitutional:  Awake, alert, cooperative, no apparent distress. Appears to be bonding well with baby, does not appear overly stressed with infant handling    Pain:  5/10 intermittent incisional pain and abdominal cramping    Breasts:  Soft without tenderness, nipples are intact; breastfeeding    Abdominal Exam: Soft, non-distended, non-tender, lax muscle tone                                 positive flatus, no BM yet    Uterus:   Fundus is mid-line, firm @ U/-1. Non-tender with palpation. Incision: Silver dressing in place    Bladder: Voiding QS                       Lochia: Small and Rubra    Extremities: Negative Estefania sign. Minimal edema     Labs:   Recent Labs     12/01/20  0802 11/30/20  2322 11/30/20  0829   WBC 10.0 15.2* 7.4   HGB 8.4* 9.1* 11.4*   HCT 26.9* 28.3* 34.8*   MCV 83.3 80.4* 80.2*    182 189       ASSESSMENT/PLAN:  1. Post-Op Day 3 RLTCS    Continue Post-Op care   Encourage ambulation and IS use   VSS   Reviewed birth experience and reports she is still processing. Tearful discussing experience but feels very supported by staff. Is glad baby is here and healthy.     Discharge planned for today 12/3/2020   Reji Self Discharge instructions were reviewed regarding post-incision care, breast care, activity, rest, diet, hygiene and PPD. Questions were answered.  RTO in 1 week for Post Op visit / Silver dressing removal   Please see attending physician/resident notes for medical management of care  2. Breastfeeding   Breast care reviewed   Denies s/s of mastitis   Has been seen by [address]  3. Rh positive/Rubella immune  4. GDMA2   6-12 week postpartum 2 hour GTT  5. History of DVT (2002)  · Lovenox 40 mg daily and continue till 6 weeks postpartum   · Referral to vascular surgery sent 11/30/20 for outpatient follow up   6. History of Postpartum Depression  · Denies s/s at this time  · Reviewed support system, rest, self care and s/s to report to CNMs. Pt verbalized understanding   7.    Acute blood loss anemia, placental abruption  · hgb 11.4>8.4  · Asymptomatic, VSS  · Iron RX on discharge

## 2020-12-03 NOTE — PROGRESS NOTES
POST OPERATIVE DAY # 3    Emilie Gonzalez is a 36 y.o. female   This patient was seen and examined today. S/P RLTCS on 20. Her pregnancy was complicated by:   Patient Active Problem List   Diagnosis    History of  section    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 ([facility] [address]    History of DVT (deep vein thrombosis)    Grand multiparity    History of placental abruption (G3)    GBS bacteriuria    History of vacuum extraction assisted delivery    Desires  (vaginal birth after ) trial    Uterine fibroid during pregnancy, antepartum    History of postpartum depression ([facility])    Obesity in pregnancy, antepartum    Insulin controlled gestational diabetes mellitus (GDM) in third trimester    RUKHSANA (amniotic fluid index) borderline low    Fetal microcephaly    Breech presentation, no version    RLTCS 20 F APG 8/9 Wt 6#14    Placental abruption (G8)    Late deceleration of fetal heart rate       Today she is doing well without any chief complaint. Her lochia is light. She denies chest pain, shortness of breath, headache, lightheadedness, blurred vision and peripheral edema. She is  breast feeding and she denies any signs or symptoms of mastitis. She is ambulating well. She is voiding without difficulty. She currently denies S/S of postpartum depression. Flatus present. Bowel movement absent. She is tolerating solids. Patient reports she would like to go home later today.      Vital Signs:  Vitals:    20 0000 20 0400 20 0815 20 2155   BP: 108/72 102/64 112/65 129/77   Pulse: 87 81 90 89   Resp: 18 18 16 16   Temp: 98.4 °F (36.9 °C) 98 °F (36.7 °C) 98.2 °F (36.8 °C) 98.7 °F (37.1 °C)   TempSrc: Oral Oral Oral Oral   SpO2:   99% 100%   Weight:       Height:           Urine Input & Output last 24hrs:   No intake or output data in the 24 hours ending 20 0109    Physical Exam:  General:  no apparent distress, alert and cooperative  Neurologic:  alert, oriented, normal speech, no focal findings or movement disorder noted  Lungs:  No increased work of breathing, good air exchange, clear to auscultation bilaterally, no crackles or wheezing  Heart:  regular rate and rhythm    Abdomen: abdomen soft, non-distended, non-tender  Fundus: non-tender, normal size, firm, below umbilicus  Incision: Silver dressing in place with small areas of serosanguinous drainage; C/D/I  Extremities:  no calf tenderness, non edematous    Labs:  Lab Results   Component Value Date    WBC 10.0 12/01/2020    HGB 8.4 (L) 12/01/2020    HCT 26.9 (L) 12/01/2020    MCV 83.3 12/01/2020     12/01/2020       Assessment/Plan:  1. Shade Robertson is a [id number] POD # 3 s/p RLTCS on 11/30/20   - Doing well, VSS    - Female infant in [address]    - Encourage ambulation and use of incentive spirometer   - CBC completed, Hgb stable at 8.4   - Pain control: Percocet/Motrin   - Antibiotic prophylaxis: S/P Ancef and Azithromycin pre-operatively; Ancef x 24 hours followed by Keflex/Flagyl x 48 hours post-operatively    - Diet: general   - Silver dressing in place, to be removed 12/7/20   - DVT prophylaxis: Lovenox 40 mg qd and SCDs   - COVID-19 negative 11/30/20   2. Rh positive/Rubella immune  3. Breast feeding    - Denies s/s mastitis   4. Acute blood loss anemia secondary to Placental Abruption    -  mL secondary to placental abruption    - Hgb 11.4 on admission > 9.1 on POD#0 > 8.4 on POD#1   - Will need oral iron supplementation on discharge   - Clinically asymptomatic, hemodynamically stable   5. GDMA2   - Pregnancy insulin regimen (Novolog 0/0/10 w/ meals and NPH 16U qhs) held postpartum    - Patient will need 2 hr GTT six weeks postpartum   6. Hx LLE DVT w/ Common Iliac and External Iliac Vein Stents    - KUB 11/30: No evidence of bowel obstruction.  No retained surgical instruments, L common iliac and external iliac vein stents   - Patient with a history of Heterozygous MTHFR mutation   - Continue Lovenox 40 mg qd and continue for 6 weeks postpartum   - Outpatient referral placed to vascular for follow up regarding iliac stents as she has not seen a provider since she stopped taking coumadin in 2002  7. Hx Postpartum Depression   - Denies s/s postpartum depression at this time   - Denies SI/HI   - SW consulted, no concerns   8. Continue post-op care  9. Anticipate discharge home later today     Counseling Completed:  Secondary Smoke risks and Sudden Infant Death Syndrome were reviewed with recommendations. Infant sleeping, \"back to sleep\" and avoidance of co-sleeping recommendations were reviewed. Signs and Symptoms of Post Partum Depression were reviewed. The patient is to call if any occur. Signs and symptoms of Mastitis were reviewed. The patient is to call if any occur for follow up.   Discharge instructions including pelvic rest, incision care, 15 lb weight restriction, no driving with pain medicine and office follow-up were reviewed with patient     Attending Physician: Dr. Nacho Bai DO  Ob/Gyn Resident  12/3/2020, 1:09 AM

## 2020-12-07 ENCOUNTER — POSTPARTUM VISIT (OUTPATIENT)
Dept: OBGYN CLINIC | Age: 40
End: 2020-12-07

## 2020-12-07 VITALS
HEIGHT: 62 IN | TEMPERATURE: 97.9 F | BODY MASS INDEX: 33.2 KG/M2 | SYSTOLIC BLOOD PRESSURE: 129 MMHG | WEIGHT: 180.4 LBS | HEART RATE: 73 BPM | DIASTOLIC BLOOD PRESSURE: 83 MMHG

## 2020-12-07 PROCEDURE — 99024 POSTOP FOLLOW-UP VISIT: CPT | Performed by: ADVANCED PRACTICE MIDWIFE

## 2020-12-07 NOTE — PROGRESS NOTES
HPI:  DELIVERY DATE: 2020  TYPE OF DELIVERY: repeat  section  PROVIDER: HANDY  PERINEUM: int    Evan Gibbs was seen for her 2 week visit. Her Female infant is healthy. She is breast feeding. She is breastfeeding without difficulty. She is experiencing pain. She is rating her pain 4  She reports her lochia is staining only  She reports no perineal discomfort. She denies urinary incontinence. Her bowels have returned to her normal pattern. She is not back to her normal activity pattern. Evan Gibbs has not engaged in intercourse. She does not report a mood disorder. She feels she is not having difficulty coping. Evan Gibbs feels her family adjustment is effective. She reports an overall positive birth experience despite the emergency. OBJECTIVE:   Blood pressure 129/83, pulse 73, temperature 97.9 °F (36.6 °C), temperature source Temporal, height 5' 2\" (1.575 m), weight 180 lb 6.4 oz (81.8 kg), last menstrual period 2020, currently breastfeeding. Silver dressing removed, incision well approximated    ASSESSMENT:  1  week postpartum visit  breast feeding  Contraceptive needs, doesn't want more pregnancies. Will use barrier for now, may want TL. PLAN:     1. Return to the office in 4 weeks. She will return for 6 PP  2. Labs were ordered. 3. Signs & Symptoms of mastitis reviewed; notify if occurs  4. Secondary smoke risks were reviewed, including increased risks of respiratory     problems, Sudden Infant Death Syndrome, and potential malignancies. 5. Family planning counseling was completed.

## 2020-12-08 ENCOUNTER — TELEPHONE (OUTPATIENT)
Dept: VASCULAR SURGERY | Age: 40
End: 2020-12-08

## 2020-12-08 NOTE — TELEPHONE ENCOUNTER
Patient was referred for  h/o DVT; s/p left common iliac and left external iliac stents. She just had a baby a few days ago. Do you want anything other than a venous duplex?    I scheduled her for Feb, per her request.

## 2020-12-09 NOTE — TELEPHONE ENCOUNTER
Imani Durán MD  [place], [place]; Marie Powers, MAIRA - NP    Cc: [facility] Clinical Staff               IVC and iliac vein duplex as well   Thank you

## 2020-12-09 NOTE — TELEPHONE ENCOUNTER
Orders generated. Iliac stents was done by [facility] in [place] in 2002. Transferred patient to central scheduling to get testing scheduled.

## 2021-01-12 ENCOUNTER — POSTPARTUM VISIT (OUTPATIENT)
Dept: OBGYN CLINIC | Age: 41
End: 2021-01-12

## 2021-01-12 VITALS
DIASTOLIC BLOOD PRESSURE: 79 MMHG | BODY MASS INDEX: 30.3 KG/M2 | TEMPERATURE: 97 F | SYSTOLIC BLOOD PRESSURE: 120 MMHG | WEIGHT: 171 LBS | HEIGHT: 63 IN | HEART RATE: 69 BPM

## 2021-01-12 DIAGNOSIS — Z86.32 HISTORY OF GESTATIONAL DIABETES: ICD-10-CM

## 2021-01-12 PROCEDURE — 0503F POSTPARTUM CARE VISIT: CPT | Performed by: ADVANCED PRACTICE MIDWIFE

## 2021-01-18 PROBLEM — O45.90 PLACENTAL ABRUPTION: Status: RESOLVED | Noted: 2020-11-30 | Resolved: 2021-01-18

## 2021-01-18 PROBLEM — O35.07X0 FETAL MICROCEPHALY: Status: RESOLVED | Noted: 2020-10-19 | Resolved: 2021-01-18

## 2021-01-18 PROBLEM — O24.414 INSULIN CONTROLLED GESTATIONAL DIABETES MELLITUS (GDM) IN THIRD TRIMESTER: Status: RESOLVED | Noted: 2020-10-13 | Resolved: 2021-01-18

## 2021-01-18 PROBLEM — O34.219 DESIRES VBAC (VAGINAL BIRTH AFTER CESAREAN) TRIAL: Chronic | Status: RESOLVED | Noted: 2020-07-24 | Resolved: 2021-01-18

## 2021-01-18 PROBLEM — O28.8 AFI (AMNIOTIC FLUID INDEX) BORDERLINE LOW: Status: RESOLVED | Noted: 2020-10-19 | Resolved: 2021-01-18

## 2021-01-18 PROBLEM — Z64.1 GRAND MULTIPARITY: Status: RESOLVED | Noted: 2020-05-06 | Resolved: 2021-01-18

## 2021-01-18 PROBLEM — R82.71 GBS BACTERIURIA: Chronic | Status: RESOLVED | Noted: 2020-06-22 | Resolved: 2021-01-18

## 2021-01-18 NOTE — PROGRESS NOTES
Chief Complaint   Patient presents with    Postpartum Care     delivered 20       HPI:  DELIVERY DATE: 2020  TYPE OF DELIVERY: repeat  section  PROVIDER: ISRA/Dr. Ofelia Trinidad  PERINEUM: intact    Reagan Klein was seen for her 6 week visit. Her Female infant is healthy. She is breast feeding. She is breastfeeding without difficulty. She is not experiencing pain. She is rating her pain   She reports her lochia is no bleeding  She reports none perineal discomfort. She denies urinary incontinence. Her bowels have returned to her normal pattern. She is back to her normal activity pattern. She has not her first menses. Reagan Klein has not engaged in intercourse. She does not report a mood disorder. She feels she is not having difficulty coping. Reagan Klein feels her family adjustment is effective. She reports an overall positive birth experience. Despite emergency. [place] supported  Her [place] Score is 5. This score does match my assessment. OBJECTIVE:   Wt Readings from Last 3 Encounters:   21 171 lb (77.6 kg)   20 180 lb 6.4 oz (81.8 kg)   20 183 lb (83 kg)       Blood pressure 120/79, pulse 69, temperature 97 °F (36.1 °C), height 5' 3\" (1.6 m), weight 171 lb (77.6 kg), last menstrual period 2020, currently breastfeeding. Breast exam: deferred to declined    Abdomen: Soft non-tender without guarding. There is diastasis present. The diastasis is 3 finger breaths of separation. Incision well healed no evidence of infection  Perineum: not inspected, pt denies concerns    Extremities: No calf tenderness bilaterally. No edema. ASSESSMENT/PLAN:    6 week postpartum visit  · She will return for annual visit  · Labs were ordered.   ·  Date of last pap: neg  breast feeding  · Signs & Symptoms of mastitis reviewed; notify if occurs  non-smoker: ? Secondary smoke risks were reviewed, including increased risks of respiratory problems, Sudden Infant Death Syndrome, and potential malignancies. Family planning needs  · Family planning counseling was completed. · Declines hormonal contraception at this time. Is hoping  will get vasectomy. Plans to use condoms  Adverse outcomes/Arteriosclerotic Cardiovascular Disease Screen (ASCVD):   Delivery: n/a  GDM: yes    - 2 hr GTT ordered: yes declines, open to A1c.  Ordered  HTN disorder: (Pre-Eclampsia and/or gHTN)  n/a    H/o DVT  On lovenox until 6 weeks    H/o of Iliac artery stent  H/o f/u scheduled for removal

## 2021-03-15 ENCOUNTER — HOSPITAL ENCOUNTER (OUTPATIENT)
Dept: VASCULAR LAB | Age: 41
Discharge: HOME | End: 2021-03-15
Payer: COMMERCIAL

## 2021-03-15 DIAGNOSIS — Z95.828 STATUS POST INSERTION OF ILIAC ARTERY STENT: ICD-10-CM

## 2021-03-15 DIAGNOSIS — Z86.718 HISTORY OF DVT (DEEP VEIN THROMBOSIS): Chronic | ICD-10-CM

## 2021-03-15 PROCEDURE — 93978 VASCULAR STUDY: CPT

## 2021-03-15 PROCEDURE — 93970 EXTREMITY STUDY: CPT

## 2021-04-05 ENCOUNTER — INITIAL CONSULT (OUTPATIENT)
Dept: VASCULAR SURGERY | Age: 41
End: 2021-04-05
Payer: COMMERCIAL

## 2021-04-05 VITALS
HEIGHT: 62 IN | WEIGHT: 180.8 LBS | BODY MASS INDEX: 33.27 KG/M2 | OXYGEN SATURATION: 98 % | DIASTOLIC BLOOD PRESSURE: 96 MMHG | RESPIRATION RATE: 17 BRPM | HEART RATE: 64 BPM | TEMPERATURE: 97.3 F | SYSTOLIC BLOOD PRESSURE: 103 MMHG

## 2021-04-05 DIAGNOSIS — I87.1 MAY-THURNER SYNDROME: ICD-10-CM

## 2021-04-05 DIAGNOSIS — Z86.718 HISTORY OF DVT (DEEP VEIN THROMBOSIS): Primary | ICD-10-CM

## 2021-04-05 PROCEDURE — 99204 OFFICE O/P NEW MOD 45 MIN: CPT | Performed by: SURGERY

## 2021-04-05 NOTE — PROGRESS NOTES
Division of Vascular Surgery        New Consult      Physician Requesting Consult:  Dr. Manas Holland    Reason for Consult:   History of DVT/iliac stenting     Chief Complaint:      History of DVT/iliac stenting     History of Present Illness:      Agnieszka Garcia is a 36 y.o. woman who presents for evaluation of her left iliac stent that was placed in  after she had an extensive left leg DVT. She does not recall too much details just that she was found to have an extensive DVT and what sounds like had catheter directed thrombolysis and ultimately stent placement. She was on anticoagulation for about 1 year afterwards. She denies any swelling or lower extremity pain/discomfort. She underwent  in November for placenta abruption and at the time abdominal x-ray performed and they identified left iliac stents in place. She is here for evaluation and if any needed follow up is required since she has not seen the vascular surgeon who did her stenting for some time now. She has not had any recurrence of DVT, pain or swelling in her legs. Medical History:     Past Medical History:   Diagnosis Date    Diet controlled gestational diabetes mellitus (GDM) in third trimester 10/13/2020    DVT (deep venous thrombosis) ([address]     while on OCPs; Lovenox during pregnancies    H/O Placental abruption     G3- primary cesearean    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 ([facility] [address]     Hx successful  (vaginal birth after )     x 4    Postpartum depression     baby #7    Thyroid cyst     fluid drained from cyst ; all benign     Surgical History:     Past Surgical History:   Procedure Laterality Date     SECTION  2014    d/t placental abruption     SECTION N/A 2020     SECTION performed by Hannah Bowser DO at [facility]     Family History:     Family History   Adopted:  Yes     Allergies:       Penicillins and Vancomycin    Medications:      Current Outpatient Medications   Medication Sig Dispense Refill    Prenatal Vit-Fe Fumarate-FA (PRENATAL VITAMIN PO) Take 1 tablet by mouth daily      ibuprofen (ADVIL;MOTRIN) 800 MG tablet Take 1 tablet by mouth every 8 hours as needed for Pain (Patient not taking: Reported on 1/12/2021) 30 tablet 0    docusate sodium (COLACE, DULCOLAX) 100 MG CAPS Take 100 mg by mouth 2 times daily (Patient not taking: Reported on 1/12/2021) 60 capsule 0    ferrous sulfate (FE TABS) 325 (65 Fe) MG EC tablet Take 1 tablet by mouth 2 times daily (Patient not taking: Reported on 1/12/2021) 90 tablet 3    Magnesium 125 MG CAPS Take 1 capsule by mouth daily      vitamin B-6 (PYRIDOXINE) 100 MG tablet Take 100 mg by mouth daily      vitamin B-12 (CYANOCOBALAMIN) 500 MCG tablet Take 500 mcg by mouth daily      FOLIC ACID PO Take 1 tablet by mouth daily      VITAMIN D PO Take 1 tablet by mouth daily       No current facility-administered medications for this visit. Social History:     Tobacco:    reports that she has never smoked. She has never used smokeless tobacco.  Alcohol:      reports previous alcohol use. Drug Use:  reports no history of drug use.     Review of Systems:     Constitutional:  negative for  fevers, chills, sweats, fatigue, and weight loss  HEENT:  negative for vision or hearing changes,   Respiratory:  negative for shortness of breath, cough, or congestion  Cardiovascular:  negative for  chest pain, palpitations  Gastrointestinal:  negative for nausea, vomiting, diarrhea, constipation, abdominal pain  Genitourinary:  negative for frequency, dysuria  Integument:  negative for rash, skin lesions  Chest/Breast:  No painful inspiration or expiration, no rib sternal pain  Musculoskeletal:  negative for muscle aches or joint pain  Neurological:  negative for headaches, dizziness, lightheadedness, numbness, pain and tingling extremities  Lymphatics: no lymphadenopathy or painful masses  Behavior/Psych:  negative for depression and anxiety    Physical Exam:     Vitals:  BP (!) 103/96 (Site: Right Upper Arm, Position: Sitting, Cuff Size: Medium Adult)   Pulse 64   Temp 97.3 °F (36.3 °C) (Temporal)   Resp 17   Ht 5' 2\" (1.575 m) Comment: per pt  Wt 180 lb 12.8 oz (82 kg)   LMP 03/15/2021 (Approximate)   SpO2 98%   BMI 33.07 kg/m²     General appearance - alert, well appearing and in no acute distress  Mental status - oriented to person, place and time with normal affect  Head - normocephalic and atraumatic  Eyes - pupils equal and reactive, extraocular eye movements intact, conjunctiva clear  Ears - hearing appears to be intact  Nose - no drainage noted  Mouth - mucous membranes moist  Neck - supple  Chest - unlabored respirations  Heart - normal rate, regular rhythm  Abdomen - soft, non-tender, non-distended  Neurological - normal speech, no focal findings or movement disorder noted  Extremities - peripheral pulses palpable, no ecchymoses, no pain, no swelling   Skin - no gross lesions, rashes, or induration noted    Imaging/Labs:     Venous duplex reveals no DVT, patent IVC and iliac veins    Abdominal x-ray revealing left iliac stents      Assessment and Plan:     Left iliac stent, 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

## 2021-05-04 ENCOUNTER — OFFICE VISIT (OUTPATIENT)
Dept: OBGYN CLINIC | Age: 41
End: 2021-05-04
Payer: COMMERCIAL

## 2021-05-04 VITALS
HEART RATE: 85 BPM | DIASTOLIC BLOOD PRESSURE: 67 MMHG | WEIGHT: 179.8 LBS | SYSTOLIC BLOOD PRESSURE: 113 MMHG | HEIGHT: 62 IN | BODY MASS INDEX: 33.09 KG/M2

## 2021-05-04 DIAGNOSIS — R10.2 PELVIC PAIN: ICD-10-CM

## 2021-05-04 DIAGNOSIS — N94.6 DYSMENORRHEA: Primary | ICD-10-CM

## 2021-05-04 DIAGNOSIS — Z15.89 HETEROZYGOUS MTHFR MUTATION C677T: ICD-10-CM

## 2021-05-04 DIAGNOSIS — Z86.718 HISTORY OF DVT (DEEP VEIN THROMBOSIS): ICD-10-CM

## 2021-05-04 PROCEDURE — 99213 OFFICE O/P EST LOW 20 MIN: CPT | Performed by: OBSTETRICS & GYNECOLOGY

## 2021-05-04 NOTE — PROGRESS NOTES
[facility]  [facility] - [address]  Dept: [phone]  Dept Fax: [phone]    21    Chief Complaint   Patient presents with    Other       Miguelbrody Gustafson is a [address] y.o[address] [id number] who follows with the midwives. She presents today to discuss treatment options for her periods. Menarche at 11yo. Her periods are regular and last for 4-5 days. They are heavy for the first 2-3 days. She describes them as painful and uses tylenol with some relief. She also has intermittent pelvic pain and is wondering if she has endometriosis. Patient reports pertinent h/o DVT in  while on OCPs. She is s/p iliac stent placement. She was told she needed to avoid hormones so she isn't sure what her options are. REVIEW OF SYSTEMS:    Constitutional: negative fever, negative chills  HEENT: negative visual disturbances, negative headaches  Respiratory: negative dyspnea, negative cough  Cardiovascular: negative chest pain,  negative palpitations  Gastrointestinal: negative Abdominal pain, negative RUQ pain, negative N/V/D, negative constipation  Genitourinary: negative dysuria, negative vaginal discharge  Dermatological: negative rash, negative wounds  Hematologic: negative bleeding/clotting disorder  Immunologic: negative recent illness, negative recent sick contact, negative allergic reactions  Lymphatic: negative lymph nodes  Musculoskeletal: negative back pain, negative myalgias, negative arthralgias  Neurological:  negative dizziness, negative weakness  Behavior/Psych: negative depression, negative anxiety    Past Medical History:   Diagnosis Date    Diet controlled gestational diabetes mellitus (GDM) in third trimester 10/13/2020    DVT (deep venous thrombosis) ([address]     while on OCPs;  Lovenox during pregnancies    H/O Placental abruption     G3- primary cesearean    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 ([facility] [address]     Hx successful  (vaginal birth after )     x 4    Postpartum depression     baby #7    Thyroid cyst     fluid drained from cyst ; all benign     Past Surgical History:   Procedure Laterality Date     SECTION  2014    d/t placental abruption     SECTION N/A 2020     SECTION performed [facility]     Allergies   Allergen Reactions    Penicillins      Unknown childhood reaction    Vancomycin Anxiety     Other reaction(s): Facial Swelling  HCL SOLR: unable to verify HCL solution, pt states      Current Outpatient Medications   Medication Sig Dispense Refill    Prenatal Vit-Fe Fumarate-FA (PRENATAL VITAMIN PO) Take 1 tablet by mouth daily       No current facility-administered medications for this visit.       Social History     Socioeconomic History    Marital status:      Spouse name: Not on file    Number of children: Not on file    Years of education: Not on file    Highest education level: Not on file   Occupational History    Not on file   Social Needs    Financial resource strain: Not hard at all    Food insecurity     Worry: Never true     Inability: Never true   [employer] needs     Medical: No     Non-medical: No   Tobacco Use    Smoking status: Never Smoker    Smokeless tobacco: Never Used   Substance and Sexual Activity    Alcohol use: Not Currently    Drug use: Never    Sexual activity: Yes     Partners: Male   Lifestyle    Physical activity     Days per week: Not on file     Minutes per session: Not on file    Stress: Not on file   Relationships    Social connections     Talks on phone: Not on file     Gets together: Not on file     Attends Yarsanism service: Not on file     Active member of club or organization: Not on file     Attends meetings of clubs or organizations: Not on file     Relationship status: Not on file    Intimate partner violence     Fear of current or ex partner: Not on file     Emotionally abused: Not on file Physically abused: Not on file     Forced sexual activity: Not on file   Other Topics Concern    Not on file   Social History Narrative    Not on file     Family History   Adopted: Yes       Vitals:    05/04/21 1337   BP: 113/67   Site: Right Upper Arm   Position: Sitting   Cuff Size: Large Adult   Pulse: 85   Weight: 179 lb 12.8 oz (81.6 kg)   Height: 5' 2\" (1.575 m)       Physical exam  Gen: no acute distress, appears comfortable  Heart: regular rate and rhythm, no murmurs appreciated  Lungs: clear to auscultation bilaterally  Abd: soft, nontender, nondistended, no rebound/guarding/rigidity  Ext: no edema or calf tenderness      Assessment/Plan   Dana Barba is a 36 y.o. [id number] with painful periods, intermittent pelvic pain and h/o DVT   - Reviewed medical management options for dysmenorrhea +/- underlying endometriosis, including side effect profiles and dosing regimens. Reviewed that progesterone only options are [facility] category 2 and do not increase her risk of stroke/clot. - Risks, benefits and alternatives of diagnostic surgery discussed, including but not limited to bleeding, scarring, infection, injury to surrounding tissues (bowel, bladder, nerves, vessels, etc.), need for more surgery, need for blood or blood products, death, post-op clots of lung of legs, and pneumonia. - All questions answered. Patient desires to discuss her options with her  and will call once she decides.       See-Chinyere Aguilar, DO  [address]

## 2021-06-16 ENCOUNTER — TELEPHONE (OUTPATIENT)
Dept: OBGYN CLINIC | Age: 41
End: 2021-06-16

## 2021-06-16 NOTE — TELEPHONE ENCOUNTER
Patient had C section 6 months ago and is experiencing abdominal pain and discomfort where incision is. Spoke with clinical staff and they advised she speak directly with midwife. Patient states Nikia Cart was present. Please advise.

## 2021-06-17 NOTE — TELEPHONE ENCOUNTER
Brian Craig was trying to respond to Tash. Can you forward this to her my chart or give her a call? Thank you!! Hung Levine, you can have some residual discomfort from scar tissue with incisions. It would be under the skin but not deep in abdomen. If you are having pelvic pain deeper in your abdomen, I would suggest you come in to be checked.

## 2021-06-24 ENCOUNTER — OFFICE VISIT (OUTPATIENT)
Dept: OBGYN CLINIC | Age: 41
End: 2021-06-24
Payer: COMMERCIAL

## 2021-06-24 VITALS
WEIGHT: 183.4 LBS | BODY MASS INDEX: 33.75 KG/M2 | DIASTOLIC BLOOD PRESSURE: 71 MMHG | SYSTOLIC BLOOD PRESSURE: 113 MMHG | HEART RATE: 84 BPM | HEIGHT: 62 IN

## 2021-06-24 DIAGNOSIS — R10.2 PELVIC PAIN: Primary | ICD-10-CM

## 2021-06-24 PROCEDURE — 99213 OFFICE O/P EST LOW 20 MIN: CPT | Performed by: ADVANCED PRACTICE MIDWIFE

## 2021-06-24 ASSESSMENT — ANXIETY QUESTIONNAIRES
6. BECOMING EASILY ANNOYED OR IRRITABLE: 0-NOT AT ALL
5. BEING SO RESTLESS THAT IT IS HARD TO SIT STILL: 0-NOT AT ALL
3. WORRYING TOO MUCH ABOUT DIFFERENT THINGS: 0-NOT AT ALL
GAD7 TOTAL SCORE: 0
1. FEELING NERVOUS, ANXIOUS, OR ON EDGE: 0-NOT AT ALL
2. NOT BEING ABLE TO STOP OR CONTROL WORRYING: 0-NOT AT ALL
7. FEELING AFRAID AS IF SOMETHING AWFUL MIGHT HAPPEN: 0-NOT AT ALL
4. TROUBLE RELAXING: 0-NOT AT ALL

## 2021-06-24 ASSESSMENT — PATIENT HEALTH QUESTIONNAIRE - PHQ9
SUM OF ALL RESPONSES TO PHQ QUESTIONS 1-9: 0
SUM OF ALL RESPONSES TO PHQ9 QUESTIONS 1 & 2: 0
1. LITTLE INTEREST OR PLEASURE IN DOING THINGS: 0
2. FEELING DOWN, DEPRESSED OR HOPELESS: 0
SUM OF ALL RESPONSES TO PHQ QUESTIONS 1-9: 0
SUM OF ALL RESPONSES TO PHQ QUESTIONS 1-9: 0

## 2021-06-24 NOTE — PROGRESS NOTES
Padma Baum (:  1980) is a 39 y.o. female,Established patient, here for evaluation of the following chief complaint(s):  Abdominal Pain (Csection incision )         ASSESSMENT/PLAN:  1. Pelvic pain  -Discussed discomfort is likely associated with inflammatory response d/t surgery and insorb stables. Discussed pelvic floor physical therapy and she is agreeable referral sent. Incision appears well intact and no evidence of infection. RTC for annual with pap       SUBJECTIVE/OBJECTIVE:  Jose Zhu presents to the office for concerns for pelvic pain. States it has been ongoing since her  . States she is not having pain with intercourse but she is having more discomfort around her  incision. Denies s/s of infection like fever, chills, and redness around area. Patient's last menstrual period was 2021 (exact date). Of note pt's skin closure was done with insorb staples. Review of Systems   Constitutional: Negative. HENT: Negative. Eyes: Negative. Respiratory: Negative. Cardiovascular: Negative. Gastrointestinal: Negative. Endocrine: Negative. Genitourinary: Positive for pelvic pain. Musculoskeletal: Negative. Skin: Negative. Allergic/Immunologic: Negative. Neurological: Negative. Hematological: Negative. Psychiatric/Behavioral: Negative. Physical Exam  Constitutional:       Appearance: Normal appearance. Abdominal:      General: Abdomen is flat. Palpations: Abdomen is soft. Comments:  incision well healed no evidence of infection. Pt reports some tenderness are incision. Skin:     General: Skin is warm and dry. Neurological:      Mental Status: She is alert. Psychiatric:         Mood and Affect: Mood normal.         Behavior: Behavior normal.         Thought Content:  Thought content normal.         Judgment: Judgment normal.              An electronic signature was used to authenticate this note.    --Maggy Bowles, MAIRA - GLORY

## 2021-06-30 ASSESSMENT — ENCOUNTER SYMPTOMS
GASTROINTESTINAL NEGATIVE: 1
RESPIRATORY NEGATIVE: 1
ALLERGIC/IMMUNOLOGIC NEGATIVE: 1
EYES NEGATIVE: 1

## 2021-07-12 ENCOUNTER — TELEPHONE (OUTPATIENT)
Dept: OBGYN CLINIC | Age: 41
End: 2021-07-12

## 2021-07-12 DIAGNOSIS — N61.0 MASTITIS, RIGHT, ACUTE: Primary | ICD-10-CM

## 2021-07-12 RX ORDER — CEPHALEXIN 500 MG/1
500 CAPSULE ORAL 2 TIMES DAILY
Qty: 20 CAPSULE | Refills: 0 | Status: SHIPPED | OUTPATIENT
Start: 2021-07-12 | End: 2021-07-22

## 2021-07-12 RX ORDER — CEPHALEXIN 500 MG/1
500 CAPSULE ORAL 2 TIMES DAILY
Qty: 20 CAPSULE | Refills: 0 | Status: CANCELLED | OUTPATIENT
Start: 2021-07-12 | End: 2021-07-22

## 2021-07-12 NOTE — TELEPHONE ENCOUNTER
Julian Mccurdy reports her child had a cold and did not nurse for a few days and she feels she has mastitis to her right breast. Reports red/firm area that is streaking. Has continued pumping and is not getting much out, feels ducts are plugged. She has been drinking lots of water, no reported fever. She reports she has had mastitis 10-15 times in the past ([id number]) and is confident that is her current issue. Reports she always takes Keflex 500 mg BID and it clears it up. Agrees to contact CNMs if no relief with 48 hours. Reviewed massage, continued feeding, stress reduction, increased fluids, and warning signs. She verbalizes understanding.

## 2021-07-12 NOTE — TELEPHONE ENCOUNTER
Pt call complaint of beast pain,swelling and warmth breast she is current breastfeeding pt denied fever or chills   Please advised

## 2022-01-10 ENCOUNTER — TELEPHONE (OUTPATIENT)
Dept: OBGYN CLINIC | Age: 42
End: 2022-01-10

## 2022-01-10 NOTE — TELEPHONE ENCOUNTER
janam for pt to let us know if she still plans on completing her lab work from 21 because the orders will  on 22.

## 2024-01-15 ENCOUNTER — OFFICE VISIT (OUTPATIENT)
Dept: OBGYN CLINIC | Age: 44
End: 2024-01-15
Payer: COMMERCIAL

## 2024-01-15 VITALS
HEIGHT: 62 IN | WEIGHT: 174 LBS | DIASTOLIC BLOOD PRESSURE: 66 MMHG | SYSTOLIC BLOOD PRESSURE: 110 MMHG | BODY MASS INDEX: 32.02 KG/M2

## 2024-01-15 DIAGNOSIS — Z01.419 WELL WOMAN EXAM WITH ROUTINE GYNECOLOGICAL EXAM: Primary | ICD-10-CM

## 2024-01-15 PROCEDURE — 99396 PREV VISIT EST AGE 40-64: CPT

## 2024-01-15 ASSESSMENT — PATIENT HEALTH QUESTIONNAIRE - PHQ9
SUM OF ALL RESPONSES TO PHQ9 QUESTIONS 1 & 2: 0
SUM OF ALL RESPONSES TO PHQ QUESTIONS 1-9: 0
1. LITTLE INTEREST OR PLEASURE IN DOING THINGS: 0
SUM OF ALL RESPONSES TO PHQ QUESTIONS 1-9: 0
2. FEELING DOWN, DEPRESSED OR HOPELESS: 0

## 2024-01-15 ASSESSMENT — ANXIETY QUESTIONNAIRES
GAD7 TOTAL SCORE: 0
7. FEELING AFRAID AS IF SOMETHING AWFUL MIGHT HAPPEN: 0
6. BECOMING EASILY ANNOYED OR IRRITABLE: 0
1. FEELING NERVOUS, ANXIOUS, OR ON EDGE: 0
3. WORRYING TOO MUCH ABOUT DIFFERENT THINGS: 0
5. BEING SO RESTLESS THAT IT IS HARD TO SIT STILL: 0
4. TROUBLE RELAXING: 0
2. NOT BEING ABLE TO STOP OR CONTROL WORRYING: 0

## 2024-01-15 ASSESSMENT — ENCOUNTER SYMPTOMS
GASTROINTESTINAL NEGATIVE: 1
RESPIRATORY NEGATIVE: 1

## 2024-01-15 NOTE — PROGRESS NOTES
[facility]  [address]   [address]  [facility] [address]  Dept: [phone]    Patient Name: Arlette Doyle  Patient Age: 43 y.o.  Date of Visit: 1/15/2024    Subjective  Chief Complaint   Patient presents with    Annual Exam     HPI:  Arlette Doyle is a 43 y.o. female who arrives to office as an established patient for annual exam.     Patient denies concerns today.  Patient reports is  sexually active with 1 male partner(s).   Patient denies pain with sex.  Patient denies a history of sexually transmitted infection(s).  Patient does not want screening for sexually transmitted infection(s).  Reports is not on contraception.    Review of Systems   Constitutional: Negative.    Respiratory: Negative.     Cardiovascular: Negative.    Gastrointestinal: Negative.    Endocrine: Negative.    Genitourinary: Negative.    Skin: Negative.    Neurological: Negative.    Psychiatric/Behavioral: Negative.       Preventive Health Screening:   Date of last pap: normal 2020  History of abnormal pap: denies          HPV typing/date: negative, 2020  Date of last mammogram: BI-RADS 1, has order from PCP  Date of last DEXA scan: N/A   Date of last colonoscopy: N/A    History of Gestational Diabetes: Yes     If Yes, Glucose screening ordered: No, sees PCP    Preventive screening: Yes, with PCP    Family history of Breast, Ovarian, Colon or Uterine Cancer:  No, unsure     If Yes see scanned worksheet        1/15/2024     1:28 PM 6/24/2021     1:07 PM 7/23/2020     1:25 PM   PHQ Scores   PHQ2 Score 0 0 0   PHQ9 Score 0 0 0     Interpretation of Total Score Depression Severity: 1-4 = Minimal depression, 5-9 = Mild depression, 10-14 = Moderate depression, 15-19 = Moderately severe depression, 20-27 = Severe depression      1/15/2024     1:00 PM 6/24/2021     1:00 PM   MIGEL 7 SCORE   MIGEL-7 Total Score 0    MIGEL-7 Total Score  0

## 2024-01-15 NOTE — PROGRESS NOTES
Pt is here for annual exam  Last Mamm n/a  Last Pap 2/5/20 no iel   No concerns today  MIGEL- 0   PHQ-2 - 0

## 2024-11-11 ENCOUNTER — OFFICE VISIT (OUTPATIENT)
Dept: OBGYN CLINIC | Age: 44
End: 2024-11-11
Payer: COMMERCIAL

## 2024-11-11 VITALS
HEIGHT: 62 IN | WEIGHT: 184.2 LBS | DIASTOLIC BLOOD PRESSURE: 78 MMHG | BODY MASS INDEX: 33.9 KG/M2 | SYSTOLIC BLOOD PRESSURE: 116 MMHG

## 2024-11-11 DIAGNOSIS — R23.2 HOT FLASHES: Primary | ICD-10-CM

## 2024-11-11 PROBLEM — Z87.59 HISTORY OF PLACENTAL ABRUPTION: Status: RESOLVED | Noted: 2020-05-19 | Resolved: 2024-11-11

## 2024-11-11 PROBLEM — Z87.59 HISTORY OF VACUUM EXTRACTION ASSISTED DELIVERY: Status: RESOLVED | Noted: 2020-07-24 | Resolved: 2024-11-11

## 2024-11-11 PROBLEM — Z98.891 HISTORY OF CESAREAN SECTION: Status: RESOLVED | Noted: 2017-11-08 | Resolved: 2024-11-11

## 2024-11-11 PROBLEM — O99.210 OBESITY IN PREGNANCY, ANTEPARTUM: Status: RESOLVED | Noted: 2020-07-24 | Resolved: 2024-11-11

## 2024-11-11 PROCEDURE — 99214 OFFICE O/P EST MOD 30 MIN: CPT | Performed by: ADVANCED PRACTICE MIDWIFE

## 2024-11-11 SDOH — ECONOMIC STABILITY: FOOD INSECURITY: WITHIN THE PAST 12 MONTHS, THE FOOD YOU BOUGHT JUST DIDN'T LAST AND YOU DIDN'T HAVE MONEY TO GET MORE.: NEVER TRUE

## 2024-11-11 SDOH — ECONOMIC STABILITY: INCOME INSECURITY: HOW HARD IS IT FOR YOU TO PAY FOR THE VERY BASICS LIKE FOOD, HOUSING, MEDICAL CARE, AND HEATING?: NOT VERY HARD

## 2024-11-11 SDOH — ECONOMIC STABILITY: FOOD INSECURITY: WITHIN THE PAST 12 MONTHS, YOU WORRIED THAT YOUR FOOD WOULD RUN OUT BEFORE YOU GOT MONEY TO BUY MORE.: NEVER TRUE

## 2024-11-11 SDOH — ECONOMIC STABILITY: TRANSPORTATION INSECURITY
IN THE PAST 12 MONTHS, HAS LACK OF TRANSPORTATION KEPT YOU FROM MEETINGS, WORK, OR FROM GETTING THINGS NEEDED FOR DAILY LIVING?: NO

## 2024-11-11 ASSESSMENT — ENCOUNTER SYMPTOMS
EYES NEGATIVE: 1
RESPIRATORY NEGATIVE: 1
GASTROINTESTINAL NEGATIVE: 1
ALLERGIC/IMMUNOLOGIC NEGATIVE: 1

## 2024-11-11 NOTE — PROGRESS NOTES
Subjective:  Chief Complaint   Patient presents with    Menopause     CONCERNS      HPI:  Arlette Doyle is a 44 y.o. female who arrives to office as an established patient.    Arlette reports over the last 3-4 cycles she has noticed some pre-menstrual mood changes similar to how she felt with PPD. Significant but lasting one day.   Last month her period was a week early, though usually she is normal.   Not noticing cervical mucus changes mid-cycle like she used to. Noticing rare hot flashes.   For painful menses she uses Motrin around the clock the first few days which takes the edge off. History of 8 deliveries and hx uterine fibroid.   Hx thyroid surgery and labs managed by PCP.     Review of Systems   Constitutional: Negative.    HENT: Negative.     Eyes: Negative.    Respiratory: Negative.     Cardiovascular: Negative.    Gastrointestinal: Negative.    Endocrine: Negative.    Genitourinary:  Positive for menstrual problem. Negative for frequency, pelvic pain and vaginal discharge.   Musculoskeletal: Negative.    Skin: Negative.    Allergic/Immunologic: Negative.    Neurological: Negative.    Hematological: Negative.    Psychiatric/Behavioral: Negative.         Objective:  Vitals:    24 1503   BP: 116/78   Weight: 83.6 kg (184 lb 3.2 oz)   Height: 1.575 m (5' 2\")      Body mass index is 33.69 kg/m².  Patient's last menstrual period was 11/10/2024.  No current outpatient medications on file prior to visit.     No current facility-administered medications on file prior to visit.      Past Medical History:   Diagnosis Date    Deep vein thrombosis (HCC)     Diet controlled gestational diabetes mellitus (GDM) in third trimester 10/13/2020    DVT (deep venous thrombosis) (HCC)     while on OCPs; Lovenox during pregnancies    Gestational diabetes mellitus     H/O Placental abruption     G3- primary cesearean    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     Hx successful  (vaginal birth after )     x 4

## 2025-01-15 DIAGNOSIS — R23.2 HOT FLASHES: ICD-10-CM

## 2025-01-20 ENCOUNTER — OFFICE VISIT (OUTPATIENT)
Dept: OBGYN CLINIC | Age: 45
End: 2025-01-20
Payer: COMMERCIAL

## 2025-01-20 ENCOUNTER — HOSPITAL ENCOUNTER (OUTPATIENT)
Age: 45
Setting detail: SPECIMEN
Discharge: HOME | End: 2025-01-20

## 2025-01-20 VITALS
DIASTOLIC BLOOD PRESSURE: 68 MMHG | HEART RATE: 76 BPM | HEIGHT: 62 IN | SYSTOLIC BLOOD PRESSURE: 112 MMHG | BODY MASS INDEX: 33.69 KG/M2 | WEIGHT: 183.1 LBS

## 2025-01-20 DIAGNOSIS — Z01.419 WELL WOMAN EXAM WITH ROUTINE GYNECOLOGICAL EXAM: Primary | ICD-10-CM

## 2025-01-20 DIAGNOSIS — Z12.4 CERVICAL CANCER SCREENING: ICD-10-CM

## 2025-01-20 PROCEDURE — 99396 PREV VISIT EST AGE 40-64: CPT | Performed by: ADVANCED PRACTICE MIDWIFE

## 2025-01-20 PROCEDURE — 99459 PELVIC EXAMINATION: CPT | Performed by: ADVANCED PRACTICE MIDWIFE

## 2025-01-20 SDOH — ECONOMIC STABILITY: FOOD INSECURITY: WITHIN THE PAST 12 MONTHS, YOU WORRIED THAT YOUR FOOD WOULD RUN OUT BEFORE YOU GOT MONEY TO BUY MORE.: NEVER TRUE

## 2025-01-20 SDOH — ECONOMIC STABILITY: FOOD INSECURITY: WITHIN THE PAST 12 MONTHS, THE FOOD YOU BOUGHT JUST DIDN'T LAST AND YOU DIDN'T HAVE MONEY TO GET MORE.: NEVER TRUE

## 2025-01-20 ASSESSMENT — PATIENT HEALTH QUESTIONNAIRE - PHQ9
SUM OF ALL RESPONSES TO PHQ QUESTIONS 1-9: 0
1. LITTLE INTEREST OR PLEASURE IN DOING THINGS: NOT AT ALL
2. FEELING DOWN, DEPRESSED OR HOPELESS: NOT AT ALL
SUM OF ALL RESPONSES TO PHQ QUESTIONS 1-9: 0
SUM OF ALL RESPONSES TO PHQ QUESTIONS 1-9: 0
SUM OF ALL RESPONSES TO PHQ9 QUESTIONS 1 & 2: 0
SUM OF ALL RESPONSES TO PHQ QUESTIONS 1-9: 0

## 2025-01-20 ASSESSMENT — ANXIETY QUESTIONNAIRES
6. BECOMING EASILY ANNOYED OR IRRITABLE: SEVERAL DAYS
4. TROUBLE RELAXING: SEVERAL DAYS
GAD7 TOTAL SCORE: 3
2. NOT BEING ABLE TO STOP OR CONTROL WORRYING: NOT AT ALL
IF YOU CHECKED OFF ANY PROBLEMS ON THIS QUESTIONNAIRE, HOW DIFFICULT HAVE THESE PROBLEMS MADE IT FOR YOU TO DO YOUR WORK, TAKE CARE OF THINGS AT HOME, OR GET ALONG WITH OTHER PEOPLE: NOT DIFFICULT AT ALL
1. FEELING NERVOUS, ANXIOUS, OR ON EDGE: NOT AT ALL
7. FEELING AFRAID AS IF SOMETHING AWFUL MIGHT HAPPEN: NOT AT ALL
3. WORRYING TOO MUCH ABOUT DIFFERENT THINGS: SEVERAL DAYS
5. BEING SO RESTLESS THAT IT IS HARD TO SIT STILL: NOT AT ALL

## 2025-01-20 ASSESSMENT — ENCOUNTER SYMPTOMS
EYES NEGATIVE: 1
RESPIRATORY NEGATIVE: 1
ALLERGIC/IMMUNOLOGIC NEGATIVE: 1
GASTROINTESTINAL NEGATIVE: 1

## 2025-01-20 NOTE — PROGRESS NOTES
Patient is here annual with pap.   Last mammogram done 5/7/24    Chaperone for Intimate Exam  Chaperone was offered as part of the rooming process. Patient declined and agrees to continue with exam without a chaperone.        1/20/2025     1:00 PM   PHQ-9    Little interest or pleasure in doing things 0   Feeling down, depressed, or hopeless 0   PHQ-2 Score 0   PHQ-9 Total Score 0          1/20/2025     1:00 PM 1/15/2024     1:00 PM 6/24/2021     1:00 PM   MIGEL-7 SCREENING   Feeling nervous, anxious, or on edge Not at all Not at all    Not being able to stop or control worrying Not at all Not at all    Worrying too much about different things Several days Not at all    Trouble relaxing Several days Not at all    Being so restless that it is hard to sit still Not at all Not at all    Becoming easily annoyed or irritable Several days Not at all    Feeling afraid as if something awful might happen Not at all Not at all    MIGEL-7 Total Score 3 0    How difficult have these problems made it for you to do your work, take care of things at home, or get along with other people? Not difficult at all     Feeling nervous, anxious, or on edge   0-Not at all   Not able to stop or control worrying   0-Not at all   Worrying too much about different things   0-Not at all   Trouble relaxing   0-Not at all   Being so restless that it's hard to sit still   0-Not at all   Becoming easily annoyed or irritable   0-Not at all   Feeling afraid as if something awful might happen   0-Not at all   MIGEL-7 Total Score   0      
Vivek, was seen with a total time spent of 30 minutes for the visit [facility]  The time component involved both face-to-face (counseling and education) and non face-to-face time (care coordination), spent in determining the total time component.      Electronically Signed by: MAIRA Fish CNM

## 2025-01-22 LAB
HPV I/H RISK 4 DNA CVX QL NAA+PROBE: NOT DETECTED
HPV SAMPLE: NORMAL
HPV, INTERPRETATION: NORMAL
HPV16 DNA CVX QL NAA+PROBE: NOT DETECTED
HPV18 DNA CVX QL NAA+PROBE: NOT DETECTED
SPECIMEN DESCRIPTION: NORMAL

## 2025-01-28 LAB — CYTOLOGY REPORT: NORMAL

## (undated) DEVICE — SOLUTION SOD CHL 0.9% 1000ML

## (undated) DEVICE — 3M™ STERI-STRIP™ ANTIMICROBIAL SKIN CLOSURES 1 IN X 5 IN, 25/CAR, 4 CAR/CASE A1848: Brand: 3M™ STERI-STRIP™

## (undated) DEVICE — SWAB MEDICATED TINC BENZ

## (undated) DEVICE — SUTURE COAT VCRL SZ 0 L36IN ABSRB VLT CTX L48MM TAPERPOINT J370H

## (undated) DEVICE — SOLUTION IV IRRIG WATER 500ML POUR BRL ST 2F7113

## (undated) DEVICE — KENDALL SCD EXPRESS SLEEVES, KNEE LENGTH, MEDIUM: Brand: KENDALL SCD

## (undated) DEVICE — TOWEL SURG W16XL26IN WHT NONFENESTRATED ST 2 PER PK